# Patient Record
Sex: FEMALE | Race: ASIAN | Employment: UNEMPLOYED | URBAN - METROPOLITAN AREA
[De-identification: names, ages, dates, MRNs, and addresses within clinical notes are randomized per-mention and may not be internally consistent; named-entity substitution may affect disease eponyms.]

---

## 2023-03-08 ENCOUNTER — HOSPITAL ENCOUNTER (EMERGENCY)
Facility: HOSPITAL | Age: 28
Discharge: HOME | End: 2023-03-08
Attending: STUDENT IN AN ORGANIZED HEALTH CARE EDUCATION/TRAINING PROGRAM
Payer: COMMERCIAL

## 2023-03-08 ENCOUNTER — APPOINTMENT (OUTPATIENT)
Dept: ULTRASOUND IMAGING | Facility: HOSPITAL | Age: 28
End: 2023-03-08
Attending: STUDENT IN AN ORGANIZED HEALTH CARE EDUCATION/TRAINING PROGRAM
Payer: COMMERCIAL

## 2023-03-08 VITALS
SYSTOLIC BLOOD PRESSURE: 109 MMHG | HEART RATE: 84 BPM | OXYGEN SATURATION: 100 % | RESPIRATION RATE: 17 BRPM | DIASTOLIC BLOOD PRESSURE: 69 MMHG | TEMPERATURE: 98 F

## 2023-03-08 DIAGNOSIS — O20.0 THREATENED ABORTION: Primary | ICD-10-CM

## 2023-03-08 PROBLEM — O20.9 VAGINAL BLEEDING IN PREGNANCY, FIRST TRIMESTER (HCC): Status: ACTIVE | Noted: 2023-03-08

## 2023-03-08 PROBLEM — O20.9 VAGINAL BLEEDING IN PREGNANCY, FIRST TRIMESTER: Status: ACTIVE | Noted: 2023-03-08

## 2023-03-08 LAB
ALBUMIN SERPL-MCNC: 3.8 G/DL (ref 3.4–5)
ALBUMIN/GLOB SERPL: 1 {RATIO} (ref 1–2)
ALP LIVER SERPL-CCNC: 51 U/L
ALT SERPL-CCNC: 19 U/L
ANION GAP SERPL CALC-SCNC: 4 MMOL/L (ref 0–18)
AST SERPL-CCNC: 15 U/L (ref 15–37)
B-HCG SERPL-ACNC: ABNORMAL MIU/ML
B-HCG UR QL: POSITIVE
BASOPHILS # BLD AUTO: 0.03 X10(3) UL (ref 0–0.2)
BASOPHILS NFR BLD AUTO: 0.3 %
BILIRUB SERPL-MCNC: 0.4 MG/DL (ref 0.1–2)
BILIRUB UR QL STRIP.AUTO: NEGATIVE
BUN BLD-MCNC: 12 MG/DL (ref 7–18)
CALCIUM BLD-MCNC: 8.9 MG/DL (ref 8.5–10.1)
CHLORIDE SERPL-SCNC: 105 MMOL/L (ref 98–112)
CO2 SERPL-SCNC: 28 MMOL/L (ref 21–32)
CREAT BLD-MCNC: 0.69 MG/DL
EOSINOPHIL # BLD AUTO: 0.05 X10(3) UL (ref 0–0.7)
EOSINOPHIL NFR BLD AUTO: 0.4 %
ERYTHROCYTE [DISTWIDTH] IN BLOOD BY AUTOMATED COUNT: 13.2 %
GFR SERPLBLD BASED ON 1.73 SQ M-ARVRAT: 121 ML/MIN/1.73M2 (ref 60–?)
GLOBULIN PLAS-MCNC: 3.9 G/DL (ref 2.8–4.4)
GLUCOSE BLD-MCNC: 102 MG/DL (ref 70–99)
GLUCOSE UR STRIP.AUTO-MCNC: NEGATIVE MG/DL
HCT VFR BLD AUTO: 36 %
HGB BLD-MCNC: 12.2 G/DL
IMM GRANULOCYTES # BLD AUTO: 0.04 X10(3) UL (ref 0–1)
IMM GRANULOCYTES NFR BLD: 0.4 %
KETONES UR STRIP.AUTO-MCNC: 20 MG/DL
LEUKOCYTE ESTERASE UR QL STRIP.AUTO: NEGATIVE
LYMPHOCYTES # BLD AUTO: 1.45 X10(3) UL (ref 1–4)
LYMPHOCYTES NFR BLD AUTO: 12.8 %
MCH RBC QN AUTO: 30.9 PG (ref 26–34)
MCHC RBC AUTO-ENTMCNC: 33.9 G/DL (ref 31–37)
MCV RBC AUTO: 91.1 FL
MONOCYTES # BLD AUTO: 0.89 X10(3) UL (ref 0.1–1)
MONOCYTES NFR BLD AUTO: 7.8 %
NEUTROPHILS # BLD AUTO: 8.89 X10 (3) UL (ref 1.5–7.7)
NEUTROPHILS # BLD AUTO: 8.89 X10(3) UL (ref 1.5–7.7)
NEUTROPHILS NFR BLD AUTO: 78.3 %
NITRITE UR QL STRIP.AUTO: NEGATIVE
OSMOLALITY SERPL CALC.SUM OF ELEC: 284 MOSM/KG (ref 275–295)
PH UR STRIP.AUTO: 5 [PH] (ref 5–8)
PLATELET # BLD AUTO: 245 10(3)UL (ref 150–450)
POTASSIUM SERPL-SCNC: 3.1 MMOL/L (ref 3.5–5.1)
PROT SERPL-MCNC: 7.7 G/DL (ref 6.4–8.2)
PROT UR STRIP.AUTO-MCNC: 100 MG/DL
RBC # BLD AUTO: 3.95 X10(6)UL
RBC #/AREA URNS AUTO: >10 /HPF
RH BLOOD TYPE: POSITIVE
SODIUM SERPL-SCNC: 137 MMOL/L (ref 136–145)
SP GR UR STRIP.AUTO: >1.03 (ref 1–1.03)
UROBILINOGEN UR STRIP.AUTO-MCNC: 4 MG/DL
WBC # BLD AUTO: 11.4 X10(3) UL (ref 4–11)

## 2023-03-08 PROCEDURE — 86900 BLOOD TYPING SEROLOGIC ABO: CPT | Performed by: STUDENT IN AN ORGANIZED HEALTH CARE EDUCATION/TRAINING PROGRAM

## 2023-03-08 PROCEDURE — 76801 OB US < 14 WKS SINGLE FETUS: CPT | Performed by: STUDENT IN AN ORGANIZED HEALTH CARE EDUCATION/TRAINING PROGRAM

## 2023-03-08 PROCEDURE — 85025 COMPLETE CBC W/AUTO DIFF WBC: CPT

## 2023-03-08 PROCEDURE — 86901 BLOOD TYPING SEROLOGIC RH(D): CPT | Performed by: STUDENT IN AN ORGANIZED HEALTH CARE EDUCATION/TRAINING PROGRAM

## 2023-03-08 PROCEDURE — 84702 CHORIONIC GONADOTROPIN TEST: CPT | Performed by: STUDENT IN AN ORGANIZED HEALTH CARE EDUCATION/TRAINING PROGRAM

## 2023-03-08 PROCEDURE — 80053 COMPREHEN METABOLIC PANEL: CPT

## 2023-03-08 PROCEDURE — 81001 URINALYSIS AUTO W/SCOPE: CPT

## 2023-03-08 PROCEDURE — 85025 COMPLETE CBC W/AUTO DIFF WBC: CPT | Performed by: STUDENT IN AN ORGANIZED HEALTH CARE EDUCATION/TRAINING PROGRAM

## 2023-03-08 PROCEDURE — 81001 URINALYSIS AUTO W/SCOPE: CPT | Performed by: STUDENT IN AN ORGANIZED HEALTH CARE EDUCATION/TRAINING PROGRAM

## 2023-03-08 PROCEDURE — 81025 URINE PREGNANCY TEST: CPT

## 2023-03-08 PROCEDURE — 99285 EMERGENCY DEPT VISIT HI MDM: CPT

## 2023-03-08 PROCEDURE — 80053 COMPREHEN METABOLIC PANEL: CPT | Performed by: STUDENT IN AN ORGANIZED HEALTH CARE EDUCATION/TRAINING PROGRAM

## 2023-03-08 PROCEDURE — 96374 THER/PROPH/DIAG INJ IV PUSH: CPT

## 2023-03-08 PROCEDURE — 76817 TRANSVAGINAL US OBSTETRIC: CPT | Performed by: STUDENT IN AN ORGANIZED HEALTH CARE EDUCATION/TRAINING PROGRAM

## 2023-03-08 PROCEDURE — 99284 EMERGENCY DEPT VISIT MOD MDM: CPT

## 2023-03-08 RX ORDER — MORPHINE SULFATE 2 MG/ML
2 INJECTION, SOLUTION INTRAMUSCULAR; INTRAVENOUS ONCE
Status: COMPLETED | OUTPATIENT
Start: 2023-03-08 | End: 2023-03-08

## 2023-03-09 ENCOUNTER — TELEPHONE (OUTPATIENT)
Dept: OBGYN CLINIC | Facility: CLINIC | Age: 28
End: 2023-03-09

## 2023-03-09 NOTE — TELEPHONE ENCOUNTER
Patient was see in the ER and was told to be seen in the office tomorrow or Saturday.  Please advise

## 2023-03-09 NOTE — DISCHARGE INSTRUCTIONS
Return to the emergency room immediately if you have vaginal bleeding that soaks through a pad every hour for 3 to 4 hours. If you have difficulty breathing, chest pain, or if you pass out come back immediately otherwise follow-up with OB/GYN on Friday or Saturday.

## 2023-03-09 NOTE — ED INITIAL ASSESSMENT (HPI)
Patient to the ER c/o vaginal cramping. Patient reports her period was one week late, she is on it now and states it feels different. Patient states she is having a lot of pain and 4 days of period.  No dizziness no n/v/d

## 2023-03-14 ENCOUNTER — LAB ENCOUNTER (OUTPATIENT)
Dept: LAB | Age: 28
End: 2023-03-14
Attending: NURSE PRACTITIONER
Payer: COMMERCIAL

## 2023-03-14 ENCOUNTER — OFFICE VISIT (OUTPATIENT)
Dept: OBGYN CLINIC | Facility: CLINIC | Age: 28
End: 2023-03-14
Payer: COMMERCIAL

## 2023-03-14 VITALS
RESPIRATION RATE: 20 BRPM | WEIGHT: 118 LBS | DIASTOLIC BLOOD PRESSURE: 70 MMHG | BODY MASS INDEX: 22.86 KG/M2 | HEART RATE: 95 BPM | HEIGHT: 60.24 IN | SYSTOLIC BLOOD PRESSURE: 118 MMHG

## 2023-03-14 DIAGNOSIS — Z32.01 PREGNANCY EXAMINATION OR TEST, POSITIVE RESULT: Primary | ICD-10-CM

## 2023-03-14 DIAGNOSIS — O20.9 BLEEDING IN EARLY PREGNANCY: ICD-10-CM

## 2023-03-14 DIAGNOSIS — O03.9 SAB (SPONTANEOUS ABORTION): ICD-10-CM

## 2023-03-14 DIAGNOSIS — O20.9 BLEEDING IN EARLY PREGNANCY: Primary | ICD-10-CM

## 2023-03-14 LAB — B-HCG SERPL-ACNC: 1235 MIU/ML

## 2023-03-14 PROCEDURE — 3008F BODY MASS INDEX DOCD: CPT | Performed by: NURSE PRACTITIONER

## 2023-03-14 PROCEDURE — 99202 OFFICE O/P NEW SF 15 MIN: CPT | Performed by: NURSE PRACTITIONER

## 2023-03-14 PROCEDURE — 84702 CHORIONIC GONADOTROPIN TEST: CPT | Performed by: NURSE PRACTITIONER

## 2023-03-14 PROCEDURE — 3078F DIAST BP <80 MM HG: CPT | Performed by: NURSE PRACTITIONER

## 2023-03-14 PROCEDURE — 3074F SYST BP LT 130 MM HG: CPT | Performed by: NURSE PRACTITIONER

## 2023-03-14 NOTE — PROGRESS NOTES
Subjective:  28year old No obstetric history on file. Patient presents with: Other: ED follow up. Started [place] 3/5 with bleeding and cramping  Went to ED on Wednesday 3/8  Still bleeding, intermittently heavy - denies hemorrhage bleeding  Also notes some slight cramping, but denies severe abdominal pain    Review of Systems:  Pertinent items are noted in the HPI. Objective:  /70   Pulse 95   Resp 20   Ht 60.24\"   Wt 118 lb (53.5 kg)   LMP 01/20/2023     Physical Examination:  General appearance: Well dressed, well nourished in no apparent distress  Neurologic/Psychiatric: Alert and oriented to person, place and time, mood normal, affect appropriate    Assessment/Plan:    Diagnoses and all orders for this visit:    Bleeding in early pregnancy  - reviewed ED notes, imaging and labs      - HCG, BETA SUBUNIT (QUANT PREGNANCY TEST); Future  - RH + - rhogam not indicated  - ED precautions for bleeding and pain given  - follow up determined by lab results    Return if symptoms worsen or fail to improve.

## 2023-03-14 NOTE — PROGRESS NOTES
Patient notified of results and recommendations. She will have repeat HCG in 1 week. Understanding expressed.

## 2023-03-21 ENCOUNTER — LAB ENCOUNTER (OUTPATIENT)
Dept: LAB | Age: 28
End: 2023-03-21
Attending: NURSE PRACTITIONER
Payer: COMMERCIAL

## 2023-03-21 DIAGNOSIS — O03.9 SAB (SPONTANEOUS ABORTION): ICD-10-CM

## 2023-03-21 DIAGNOSIS — O03.9 SAB (SPONTANEOUS ABORTION): Primary | ICD-10-CM

## 2023-03-21 LAB — B-HCG SERPL-ACNC: 282 MIU/ML

## 2023-03-21 PROCEDURE — 84702 CHORIONIC GONADOTROPIN TEST: CPT | Performed by: NURSE PRACTITIONER

## 2023-03-21 NOTE — PROGRESS NOTES
Contacted patient results and recommendations given. Patient verbalized understanding and willingness to comply. No further questions. New order placed.

## 2023-03-28 ENCOUNTER — LAB ENCOUNTER (OUTPATIENT)
Dept: LAB | Age: 28
End: 2023-03-28
Attending: NURSE PRACTITIONER
Payer: COMMERCIAL

## 2023-03-28 DIAGNOSIS — O03.9 SAB (SPONTANEOUS ABORTION): ICD-10-CM

## 2023-03-28 LAB — B-HCG SERPL-ACNC: 102 MIU/ML

## 2023-03-28 PROCEDURE — 84702 CHORIONIC GONADOTROPIN TEST: CPT

## 2023-03-29 DIAGNOSIS — O03.9 SAB (SPONTANEOUS ABORTION): Primary | ICD-10-CM

## 2023-04-05 ENCOUNTER — OFFICE VISIT (OUTPATIENT)
Facility: CLINIC | Age: 28
End: 2023-04-05
Payer: COMMERCIAL

## 2023-04-05 ENCOUNTER — LAB ENCOUNTER (OUTPATIENT)
Dept: LAB | Facility: HOSPITAL | Age: 28
End: 2023-04-05
Attending: OBSTETRICS & GYNECOLOGY
Payer: COMMERCIAL

## 2023-04-05 ENCOUNTER — TELEPHONE (OUTPATIENT)
Facility: CLINIC | Age: 28
End: 2023-04-05

## 2023-04-05 VITALS
HEIGHT: 60.24 IN | HEART RATE: 98 BPM | DIASTOLIC BLOOD PRESSURE: 62 MMHG | SYSTOLIC BLOOD PRESSURE: 100 MMHG | WEIGHT: 121 LBS | BODY MASS INDEX: 23.44 KG/M2

## 2023-04-05 DIAGNOSIS — O03.9 MISCARRIAGE: Primary | ICD-10-CM

## 2023-04-05 DIAGNOSIS — Z12.4 ROUTINE CERVICAL SMEAR: ICD-10-CM

## 2023-04-05 DIAGNOSIS — O03.9 MISCARRIAGE: ICD-10-CM

## 2023-04-05 LAB — B-HCG SERPL-ACNC: 32 MIU/ML

## 2023-04-05 PROCEDURE — 3008F BODY MASS INDEX DOCD: CPT | Performed by: OBSTETRICS & GYNECOLOGY

## 2023-04-05 PROCEDURE — 99203 OFFICE O/P NEW LOW 30 MIN: CPT | Performed by: OBSTETRICS & GYNECOLOGY

## 2023-04-05 PROCEDURE — 36415 COLL VENOUS BLD VENIPUNCTURE: CPT

## 2023-04-05 PROCEDURE — 3078F DIAST BP <80 MM HG: CPT | Performed by: OBSTETRICS & GYNECOLOGY

## 2023-04-05 PROCEDURE — 84702 CHORIONIC GONADOTROPIN TEST: CPT

## 2023-04-05 PROCEDURE — 3074F SYST BP LT 130 MM HG: CPT | Performed by: OBSTETRICS & GYNECOLOGY

## 2023-04-05 NOTE — PROGRESS NOTES
Kendall Corona is a 28year old female  Patient's last menstrual period was 2023 (exact date). Patient presents with:  New Patient: Did not like her previous doctor she saw, they never called her about her results, she did see the MyChart results but there was no note that she could see   Miscarriage: 1mo ago started miscarrying, still bleeding  . She had an ultrasound with different provider that showed a 6-week demise. She has been have beta hCGs that have been decreasing appropriately. She is still bleeding some. Minimal cramping. Periods have been irregular. Denies smoking or marijuana alcohol. pre  [place] vitamins were discussed. We will repeat hCG 1. She can attempt pregnancy again. She is Rh+. OBSTETRICS HISTORY:  OB History    Para Term  AB Living   1       1     SAB IAB Ectopic Multiple Live Births   1              # Outcome Date GA Lbr Gilson/2nd Weight Sex Delivery Anes PTL Lv   1 SAB                GYNE HISTORY:  Periods irregular light      Sexual activity: Not on file                 MEDICAL HISTORY:  History reviewed. No pertinent past medical history. SURGICAL HISTORY:  History reviewed. No pertinent surgical history.     SOCIAL HISTORY:  Social History    Socioeconomic History      Marital status:       Spouse name: Not on file      Number of children: Not on file      Years of education: Not on file      Highest education level: Not on file    Occupational History      Not on file    Tobacco Use      Smoking status: Never      Smokeless tobacco: Never    Vaping Use      Vaping status: Not on file    Substance and Sexual Activity      Alcohol use: Not Currently      Drug use: Never      Sexual activity: Not on file    Other Topics      Concerns:        Not on file    Social History Narrative      Not on file    Social Determinants of Health  Financial Resource Strain: Not on file  Food Insecurity: Not on file  Transportation Needs: Not on file  Physical Activity: Not on file  Stress: Not on file  Social Connections: Not on file  Housing Stability: Not on file    FAMILY HISTORY:  History reviewed. No pertinent family history. MEDICATIONS:  No current outpatient medications on file. ALLERGIES:  No Known Allergies      Review of Systems:  Constitutional:  Denies fatigue, night sweats, hot flashes  Gastrointestinal:  denies heartburn, abdominal pain, diarrhea or constipation  Genitourinary:  denies dysuria, incontinence, abnormal vaginal discharge, vaginal itching  Skin/Breast:  Denies any breast pain, lumps, or discharge. Neurological:  denies headaches, extremity weakness or numbness. PHYSICAL EXAM:   Constitutional: well developed, well nourished  Head/Face: normocephalic  Neck/Thyroid: thyroid symmetric, no thyromegaly, no nodules, no adenopathy  Breast: normal without palpable masses, tenderness, asymmetry, nipple discharge, nipple retraction or skin changes  Abdomen:  soft, nontender, nondistended, no masses  Skin/Hair: no unusual rashes or bruises   Extremities: no edema, no cyanosis  Psychiatric:  Oriented to time, place, person and situation. Appropriate mood and affect    Pelvic Exam:  External Genitalia: normal appearance, hair distribution, and no lesions  Urethral Meatus:  normal in size, location, without lesions and prolapse  Bladder:  No fullness, masses or tenderness  Vagina:  Normal appearance without lesions, no abnormal discharge  Cervix:  Normal without tenderness on motion minimal blood Pap and GC. Uterus: Slightly soft. Perineum: normal  Anus: no hemorroids     Assessment & Plan:  Yessica Celeste was seen today for new patient and miscarriage. Diagnoses and all orders for this visit:    Miscarriage  -     HCG, BETA SUBUNIT (QUANT PREGNANCY TEST); Future        Incomplete miscarriage desires pregnancy in the future.

## 2023-04-05 NOTE — TELEPHONE ENCOUNTER
Hcg result 32 discussed. Pt to repeat hcg in 2-3wks per . Patient verbalized understanding, agreed to and intend to comply with plan of care.

## 2023-12-06 ENCOUNTER — OFFICE VISIT (OUTPATIENT)
Facility: CLINIC | Age: 28
End: 2023-12-06
Payer: COMMERCIAL

## 2023-12-06 VITALS
BODY MASS INDEX: 24.97 KG/M2 | HEART RATE: 90 BPM | WEIGHT: 127.19 LBS | HEIGHT: 60 IN | SYSTOLIC BLOOD PRESSURE: 104 MMHG | DIASTOLIC BLOOD PRESSURE: 68 MMHG

## 2023-12-06 DIAGNOSIS — N92.6 IRREGULAR MENSES: Primary | ICD-10-CM

## 2023-12-06 PROCEDURE — 3078F DIAST BP <80 MM HG: CPT | Performed by: OBSTETRICS & GYNECOLOGY

## 2023-12-06 PROCEDURE — 99213 OFFICE O/P EST LOW 20 MIN: CPT | Performed by: OBSTETRICS & GYNECOLOGY

## 2023-12-06 PROCEDURE — 3074F SYST BP LT 130 MM HG: CPT | Performed by: OBSTETRICS & GYNECOLOGY

## 2023-12-06 PROCEDURE — 3008F BODY MASS INDEX DOCD: CPT | Performed by: OBSTETRICS & GYNECOLOGY

## 2023-12-06 NOTE — PROGRESS NOTES
Joselin Long is a 33year old female  Patient's last menstrual period was 2023 (exact date). Chief Complaint   Patient presents with    Gyn Problem     Periods come every month but pt bleeds more than usual.. period comes for a week then stops for days then starts bleeding again   Pt trying to conceive    . Had a miscarriage in March. She has been abstaining from intercourse. She has not done ovulation predictor kits or apps. Her periods are usually every month. She usually bleeds for 5 days. Her last period was November beginning  and she has not started her period yet. Normal pregnancies between 21 and 35 days were discussed. She is only taking prenatal vitamins. No bleeding after intercourse or pain. No history of PID we will check day 21 TSH and progesterone. Clomid risk and benefits were discussed. 1 time since she had the miscarriage she bled for 2 weeks. Denies smoking marijuana    OBSTETRICS HISTORY:  OB History    Para Term  AB Living   1       1     SAB IAB Ectopic Multiple Live Births   1              # Outcome Date GA Lbr Gilson/2nd Weight Sex Delivery Anes PTL Lv   1 SAB                GYNE HISTORY:  Periods regular monthly    History   Sexual Activity    Sexual activity: Yes    Partners: Male        Pap Date: 23  Pap Result Notes: neg        MEDICAL HISTORY:  Past Medical History:   Diagnosis Date    Abnormal uterine bleeding        SURGICAL HISTORY:  History reviewed. No pertinent surgical history.     SOCIAL HISTORY:  Social History     Socioeconomic History    Marital status:      Spouse name: Not on file    Number of children: Not on file    Years of education: Not on file    Highest education level: Not on file   Occupational History    Not on file   Tobacco Use    Smoking status: Never    Smokeless tobacco: Never   Vaping Use    Vaping Use: Never used   Substance and Sexual Activity    Alcohol use: Not Currently    Drug use: Never Sexual activity: Yes     Partners: Male   Other Topics Concern    Caffeine Concern No    Exercise No    Seat Belt No    Special Diet No    Stress Concern No    Weight Concern No   Social History Narrative    Not on file     Social Determinants of Health     Financial Resource Strain: Not on file   Food Insecurity: Not on file   Transportation Needs: Not on file   Physical Activity: Not on file   Stress: Not on file   Social Connections: Not on file   Housing Stability: Not on file       FAMILY HISTORY:  History reviewed. No pertinent family history. MEDICATIONS:  No current outpatient medications on file. ALLERGIES:  No Known Allergies      Review of Systems:  Constitutional:  Denies fatigue, night sweats, hot flashes  Gastrointestinal:  denies heartburn, abdominal pain, diarrhea or constipation  Genitourinary:  denies dysuria, incontinence, abnormal vaginal discharge, vaginal itching  Neurological:  denies headaches, extremity weakness or numbness. PHYSICAL EXAM:   Constitutional: well developed, well nourished  Head/Face: normocephalic    Skin/Hair: no unusual rashes or bruises   Extremities: no edema, no cyanosis  Psychiatric:  Oriented to time, place, person and situation. Appropriate mood and affect    Pelvic Exam:  External Genitalia: normal appearance, hair distribution, and no lesions  Urethral Meatus:  normal in size, location, without lesions and prolapse  Bladder:  No fullness, masses or tenderness  Vagina:  Normal appearance without lesions, no abnormal discharge  Cervix:  Normal without tenderness on motion  Uterus: normal in size, contour, position, mobility, without tenderness  Adnexa: normal without masses or tenderness  Perineum: normal  Anus: no hemorroids     Assessment & Plan:  Ishmael Ornelas was seen today for gyn problem. Diagnoses and all orders for this visit:    Irregular menses        Irregular menses attempting pregnancy since about September. Check day 21 progesterone and TSH. Clomid discussed. She is taking prenatal vitamins. Refuses flu shot.

## 2024-02-22 ENCOUNTER — OFFICE VISIT (OUTPATIENT)
Facility: CLINIC | Age: 29
End: 2024-02-22
Payer: COMMERCIAL

## 2024-02-22 VITALS
WEIGHT: 127.38 LBS | DIASTOLIC BLOOD PRESSURE: 72 MMHG | BODY MASS INDEX: 24.05 KG/M2 | SYSTOLIC BLOOD PRESSURE: 100 MMHG | HEIGHT: 61 IN | HEART RATE: 89 BPM

## 2024-02-22 DIAGNOSIS — N92.6 IRREGULAR MENSES: Primary | ICD-10-CM

## 2024-02-22 PROCEDURE — 99213 OFFICE O/P EST LOW 20 MIN: CPT | Performed by: OBSTETRICS & GYNECOLOGY

## 2024-02-22 PROCEDURE — 3074F SYST BP LT 130 MM HG: CPT | Performed by: OBSTETRICS & GYNECOLOGY

## 2024-02-22 PROCEDURE — 3008F BODY MASS INDEX DOCD: CPT | Performed by: OBSTETRICS & GYNECOLOGY

## 2024-02-22 PROCEDURE — 3078F DIAST BP <80 MM HG: CPT | Performed by: OBSTETRICS & GYNECOLOGY

## 2024-02-22 NOTE — PROGRESS NOTES
Ciara Che is a 28 year old female  Patient's last menstrual period was 02/15/2024 (exact date).   Chief Complaint   Patient presents with    Gyn Problem     Patient reports irregular periods, has bleed for 17 days this month so far on and off and would like to try conceive    .     She is having very irregular periods usually they were 6 to 7 days.  She has been bleeding lightly now for 17 days.  No pain.  She was unable to get her day 21 progesterone done because she started her period on that day.  She stopped taking the prenatal vitamin it was encouraged to start that again.  She bled the last 5 days and it was 5 days after her last period.  Clomid risk and benefits were discussed she will start it today.  She will get a day 21 progesterone CBC and TSH.    OBSTETRICS HISTORY:  OB History    Para Term  AB Living   1       1     SAB IAB Ectopic Multiple Live Births   1              # Outcome Date GA Lbr Gilson/2nd Weight Sex Delivery Anes PTL Lv   1 SAB                GYNE HISTORY:  Periods irregular light    History   Sexual Activity    Sexual activity: Yes    Partners: Male        Hx Prior Abnormal Pap: No  Pap Date: 23  Pap Result Notes: Negative        MEDICAL HISTORY:  Past Medical History:   Diagnosis Date    Abnormal uterine bleeding        SURGICAL HISTORY:  History reviewed. No pertinent surgical history.    SOCIAL HISTORY:  Social History     Socioeconomic History    Marital status:      Spouse name: Not on file    Number of children: Not on file    Years of education: Not on file    Highest education level: Not on file   Occupational History    Not on file   Tobacco Use    Smoking status: Never    Smokeless tobacco: Never   Vaping Use    Vaping Use: Never used   Substance and Sexual Activity    Alcohol use: Not Currently    Drug use: Never    Sexual activity: Yes     Partners: Male   Other Topics Concern    Caffeine Concern No    Exercise No    Seat Belt No    Special  Diet No    Stress Concern No    Weight Concern No   Social History Narrative    Not on file     Social Determinants of Health     Financial Resource Strain: Not on file   Food Insecurity: Not on file   Transportation Needs: Not on file   Physical Activity: Not on file   Stress: Not on file   Social Connections: Not on file   Housing Stability: Not on file       FAMILY HISTORY:  History reviewed. No pertinent family history.    MEDICATIONS:  No current outpatient medications on file.    ALLERGIES:  No Known Allergies      Review of Systems:  Constitutional:  Denies fatigue, night sweats, hot flashes    Neurological:  denies headaches, extremity weakness or numbness.      PHYSICAL EXAM:   Constitutional: well developed, well nourished  Head/Face: normocephalic    Skin/Hair: no unusual rashes or bruises   Extremities: no edema, no cyanosis  Psychiatric:  Oriented to time, place, person and situation. Appropriate mood and affect    Pelvic Exam:  External Genitalia: normal appearance, hair distribution, and no lesions  Urethral Meatus:  normal in size, location, without lesions and prolapse  Bladder:  No fullness, masses or tenderness  Vagina:  Normal appearance without lesions, no abnormal discharge  Cervix:  Normal without tenderness on motion  Uterus: normal in size, contour, position, mobility, without tenderness  Adnexa: normal without masses or tenderness  Perineum: normal  Anus: no hemorroids     Assessment & Plan:  There are no diagnoses linked to this encounter.    Irregular menses.  Probable anovulation.  Desires pregnancy.  Clomid was discussed.  Day 21 progesterone TSH and CBC

## 2024-02-23 ENCOUNTER — TELEPHONE (OUTPATIENT)
Facility: CLINIC | Age: 29
End: 2024-02-23

## 2024-02-23 RX ORDER — LETROZOLE 2.5 MG/1
2.5 TABLET, FILM COATED ORAL DAILY
Qty: 30 TABLET | Refills: 0 | Status: SHIPPED | OUTPATIENT
Start: 2024-02-23

## 2024-02-23 NOTE — TELEPHONE ENCOUNTER
Needs PA for clomid, awaiting for PA form from [facility].     --Serophene does not need PA will route for recommendation.

## 2024-03-07 ENCOUNTER — LAB ENCOUNTER (OUTPATIENT)
Dept: LAB | Age: 29
End: 2024-03-07
Attending: OBSTETRICS & GYNECOLOGY
Payer: COMMERCIAL

## 2024-03-07 DIAGNOSIS — N92.6 IRREGULAR MENSES: ICD-10-CM

## 2024-03-07 LAB
BASOPHILS # BLD AUTO: 0.04 X10(3) UL (ref 0–0.2)
BASOPHILS NFR BLD AUTO: 0.4 %
EOSINOPHIL # BLD AUTO: 0.1 X10(3) UL (ref 0–0.7)
EOSINOPHIL NFR BLD AUTO: 1.1 %
ERYTHROCYTE [DISTWIDTH] IN BLOOD BY AUTOMATED COUNT: 12.1 %
HCT VFR BLD AUTO: 45.3 %
HGB BLD-MCNC: 15.2 G/DL
IMM GRANULOCYTES # BLD AUTO: 0.04 X10(3) UL (ref 0–1)
IMM GRANULOCYTES NFR BLD: 0.4 %
LYMPHOCYTES # BLD AUTO: 3.36 X10(3) UL (ref 1–4)
LYMPHOCYTES NFR BLD AUTO: 35.8 %
MCH RBC QN AUTO: 30.5 PG (ref 26–34)
MCHC RBC AUTO-ENTMCNC: 33.6 G/DL (ref 31–37)
MCV RBC AUTO: 91 FL
MONOCYTES # BLD AUTO: 0.36 X10(3) UL (ref 0.1–1)
MONOCYTES NFR BLD AUTO: 3.8 %
NEUTROPHILS # BLD AUTO: 5.48 X10 (3) UL (ref 1.5–7.7)
NEUTROPHILS # BLD AUTO: 5.48 X10(3) UL (ref 1.5–7.7)
NEUTROPHILS NFR BLD AUTO: 58.5 %
PLATELET # BLD AUTO: 274 10(3)UL (ref 150–450)
PROGEST SERPL-MCNC: 7.18 NG/ML
RBC # BLD AUTO: 4.98 X10(6)UL
TSI SER-ACNC: 1.42 MIU/ML (ref 0.36–3.74)
WBC # BLD AUTO: 9.4 X10(3) UL (ref 4–11)

## 2024-03-07 PROCEDURE — 84144 ASSAY OF PROGESTERONE: CPT

## 2024-03-07 PROCEDURE — 84443 ASSAY THYROID STIM HORMONE: CPT

## 2024-03-07 PROCEDURE — 85025 COMPLETE CBC W/AUTO DIFF WBC: CPT

## 2024-03-14 ENCOUNTER — TELEPHONE (OUTPATIENT)
Facility: CLINIC | Age: 29
End: 2024-03-14

## 2024-03-14 DIAGNOSIS — N92.6 IRREGULAR MENSES: Primary | ICD-10-CM

## 2024-03-18 NOTE — TELEPHONE ENCOUNTER
Rx Clomid has been sent to her pharmacy. Patient verbalized understanding, agreed to and intend to comply with plan of care.

## 2024-03-18 NOTE — TELEPHONE ENCOUNTER
Pt calling to speak to a nurse about medication she was expecting at the pharmacy.  Please advise.

## 2024-03-21 ENCOUNTER — TELEPHONE (OUTPATIENT)
Facility: CLINIC | Age: 29
End: 2024-03-21

## 2024-03-21 NOTE — TELEPHONE ENCOUNTER
Spoke with patient. She is aware her pharmacy was updated after her prescription had been sent. She picked up the correct medication(clomid) and knows how to take it. Understanding verbalized.

## 2024-03-21 NOTE — TELEPHONE ENCOUNTER
Pt called to clarify what medication would need to be taken, Originally prescribed clomiPHENE Citrate 50 MG Oral Tab, pt picked a different medication from pharm.

## 2024-05-16 ENCOUNTER — ULTRASOUND ENCOUNTER (OUTPATIENT)
Facility: CLINIC | Age: 29
End: 2024-05-16

## 2024-05-16 PROCEDURE — 76801 OB US < 14 WKS SINGLE FETUS: CPT | Performed by: OBSTETRICS & GYNECOLOGY

## 2024-05-18 DIAGNOSIS — O36.80X0 PREGNANCY WITH INCONCLUSIVE FETAL VIABILITY, SINGLE OR UNSPECIFIED FETUS (HCC): Primary | ICD-10-CM

## 2024-05-20 ENCOUNTER — INITIAL PRENATAL (OUTPATIENT)
Facility: CLINIC | Age: 29
End: 2024-05-20

## 2024-05-20 ENCOUNTER — LAB ENCOUNTER (OUTPATIENT)
Dept: LAB | Age: 29
End: 2024-05-20
Attending: OBSTETRICS & GYNECOLOGY

## 2024-05-20 VITALS
DIASTOLIC BLOOD PRESSURE: 72 MMHG | BODY MASS INDEX: 24.62 KG/M2 | HEIGHT: 61 IN | HEART RATE: 88 BPM | WEIGHT: 130.38 LBS | SYSTOLIC BLOOD PRESSURE: 118 MMHG

## 2024-05-20 DIAGNOSIS — Z12.4 CERVICAL CANCER SCREENING: ICD-10-CM

## 2024-05-20 DIAGNOSIS — Z34.81 PRENATAL CARE, SUBSEQUENT PREGNANCY IN FIRST TRIMESTER (HCC): Primary | ICD-10-CM

## 2024-05-20 DIAGNOSIS — Z3A.08 8 WEEKS GESTATION OF PREGNANCY (HCC): ICD-10-CM

## 2024-05-20 DIAGNOSIS — Z34.81 PRENATAL CARE, SUBSEQUENT PREGNANCY IN FIRST TRIMESTER (HCC): ICD-10-CM

## 2024-05-20 LAB
ANTIBODY SCREEN: NEGATIVE
BASOPHILS # BLD AUTO: 0.03 X10(3) UL (ref 0–0.2)
BASOPHILS NFR BLD AUTO: 0.3 %
EOSINOPHIL # BLD AUTO: 0.1 X10(3) UL (ref 0–0.7)
EOSINOPHIL NFR BLD AUTO: 0.9 %
ERYTHROCYTE [DISTWIDTH] IN BLOOD BY AUTOMATED COUNT: 13.5 %
HBV SURFACE AG SER-ACNC: <0.1 [IU]/L
HBV SURFACE AG SERPL QL IA: NONREACTIVE
HCT VFR BLD AUTO: 41.7 %
HCV AB SERPL QL IA: NONREACTIVE
HGB BLD-MCNC: 14 G/DL
IMM GRANULOCYTES # BLD AUTO: 0.05 X10(3) UL (ref 0–1)
IMM GRANULOCYTES NFR BLD: 0.4 %
LYMPHOCYTES # BLD AUTO: 2.59 X10(3) UL (ref 1–4)
LYMPHOCYTES NFR BLD AUTO: 22.5 %
MCH RBC QN AUTO: 30.6 PG (ref 26–34)
MCHC RBC AUTO-ENTMCNC: 33.6 G/DL (ref 31–37)
MCV RBC AUTO: 91 FL
MONOCYTES # BLD AUTO: 0.67 X10(3) UL (ref 0.1–1)
MONOCYTES NFR BLD AUTO: 5.8 %
NEUTROPHILS # BLD AUTO: 8.07 X10 (3) UL (ref 1.5–7.7)
NEUTROPHILS # BLD AUTO: 8.07 X10(3) UL (ref 1.5–7.7)
NEUTROPHILS NFR BLD AUTO: 70.1 %
PLATELET # BLD AUTO: 246 10(3)UL (ref 150–450)
RBC # BLD AUTO: 4.58 X10(6)UL
RH BLOOD TYPE: POSITIVE
RUBV IGG SER QL: POSITIVE
RUBV IGG SER-ACNC: 73.9 IU/ML (ref 10–?)
T PALLIDUM AB SER QL IA: NONREACTIVE
TSI SER-ACNC: 0.86 MIU/ML (ref 0.36–3.74)
WBC # BLD AUTO: 11.5 X10(3) UL (ref 4–11)

## 2024-05-20 PROCEDURE — 84443 ASSAY THYROID STIM HORMONE: CPT | Performed by: OBSTETRICS & GYNECOLOGY

## 2024-05-20 PROCEDURE — 87389 HIV-1 AG W/HIV-1&-2 AB AG IA: CPT | Performed by: OBSTETRICS & GYNECOLOGY

## 2024-05-20 PROCEDURE — 87591 N.GONORRHOEAE DNA AMP PROB: CPT | Performed by: OBSTETRICS & GYNECOLOGY

## 2024-05-20 PROCEDURE — 86901 BLOOD TYPING SEROLOGIC RH(D): CPT | Performed by: OBSTETRICS & GYNECOLOGY

## 2024-05-20 PROCEDURE — 86803 HEPATITIS C AB TEST: CPT | Performed by: OBSTETRICS & GYNECOLOGY

## 2024-05-20 PROCEDURE — 86780 TREPONEMA PALLIDUM: CPT | Performed by: OBSTETRICS & GYNECOLOGY

## 2024-05-20 PROCEDURE — 87086 URINE CULTURE/COLONY COUNT: CPT | Performed by: OBSTETRICS & GYNECOLOGY

## 2024-05-20 PROCEDURE — 85025 COMPLETE CBC W/AUTO DIFF WBC: CPT | Performed by: OBSTETRICS & GYNECOLOGY

## 2024-05-20 PROCEDURE — 86762 RUBELLA ANTIBODY: CPT | Performed by: OBSTETRICS & GYNECOLOGY

## 2024-05-20 PROCEDURE — 87340 HEPATITIS B SURFACE AG IA: CPT | Performed by: OBSTETRICS & GYNECOLOGY

## 2024-05-20 PROCEDURE — 86850 RBC ANTIBODY SCREEN: CPT | Performed by: OBSTETRICS & GYNECOLOGY

## 2024-05-20 PROCEDURE — 86900 BLOOD TYPING SEROLOGIC ABO: CPT | Performed by: OBSTETRICS & GYNECOLOGY

## 2024-05-20 PROCEDURE — 87491 CHLMYD TRACH DNA AMP PROBE: CPT | Performed by: OBSTETRICS & GYNECOLOGY

## 2024-05-20 RX ORDER — CHOLECALCIFEROL (VITAMIN D3) 25 MCG
1 TABLET,CHEWABLE ORAL DAILY
COMMUNITY

## 2024-05-20 NOTE — PROGRESS NOTES
1st OB    Patient has no complaints, denies h/o HSV, blood transfusion, hepatitis    EDC by LMP c/w 8 wks US    Pap smear today  - NIPS and carrier screen next visit  - 20 wk US

## 2024-05-21 LAB
C TRACH DNA SPEC QL NAA+PROBE: NEGATIVE
N GONORRHOEA DNA SPEC QL NAA+PROBE: NEGATIVE

## 2024-05-24 LAB
.: NORMAL
.: NORMAL

## 2024-06-19 PROBLEM — O09.291 PREGNANCY WITH HISTORY OF MISCARRIAGE, FIRST TRIMESTER (HCC): Status: ACTIVE | Noted: 2024-06-19

## 2024-06-19 PROBLEM — N92.6 IRREGULAR MENSES: Status: ACTIVE | Noted: 2023-03-01

## 2024-06-19 PROBLEM — O20.9 VAGINAL BLEEDING IN PREGNANCY, FIRST TRIMESTER (HCC): Status: RESOLVED | Noted: 2023-03-08 | Resolved: 2024-06-19

## 2024-06-19 PROBLEM — O09.01 CLOMID PREGNANCY IN FIRST TRIMESTER (HCC): Status: ACTIVE | Noted: 2024-06-19

## 2024-06-19 NOTE — PROGRESS NOTES
JACQUELINE    Chief Complaint   Patient presents with    Prenatal Care     JACQUELINE 13w 0d  -No complaints   No  needed    Partner here   No nausea, vaginal bleeding, sometimes some groin pain left side.     28 year old  at 13w0d  SHANTI 24 by LMP c/w 8w0d US  A+    -NIPT - would like. Ordered Will do today.   -Declines NT ultrasound   -Carrier screen - would like. Ordered. Will do today  -AFP discussed - would like to do it.   -Fetal anatomy US at 20 wk discussed & ordered     Clomid pregnancy/Irregular menses  -24 TSH 0.859 - 1st trimester   -A1c ordered     RTC 4 wk

## 2024-06-19 NOTE — PATIENT INSTRUCTIONS
As a pregnant patient, if you are having a medical problem please CALL the office [phone] (do not \"[employer] message\") as we want to address your concerns immediately due to the pregnancy.     We share call with another [facility] ([facility]) of physicians - Elliott Young, Ekta Quinn, Adenike De La Fuente, Julien Jones.     We cannot guarantee that you will not see a male provider.     For nausea and vomiting:  -take vitamin B6 (25mg) +/- unisom (aka doxylamine) 12.5mg (this is half of a 25mg tablet) every night to PREVENT morning sickness. Buy these over the counter. Can also take these in the morning and in the afternoon, too, if needed.  -call if this is not effective, we can try a prescription nausea medication    Prenatal vitamin   -make sure it CONTAINS IRON. The gummy vitamins frequently DO NOT CONTAIN IRON.     Genetic screening  2 types to consider - both OPTIONAL:   1) Screening of the fetus for chromosomal disorders:   -Multiple ways to screen for this.   -None of these tests are 100% accurate. If an abnormal result is obtained, further testing is advised.   -Most popular are:       () cell free DNA (also called \"NIPS\" or \"non-invasive prenatal screening\")   -blood draw only   -looks for fragments of placental DNA in maternal bloodstream   -placental DNA does NOT always match fetal DNA  -timing: must be at least 10w0d to be drawn   -where: done in our office (or with high risk OB/MFM)   -cost: genetics company checks insurance benefits & calls you with cost estimate prior to running the test(s)          () nuchal translucency (NT) ultrasound   -measures thickness of fetal neck skin fold (looking for abnormal swelling)   -timinw0d - 13w6d   -where: high risk OB/MFM office. Cannot be done at our office.     2) Screening of the mother   -done to see if she is a carrier of a mutation that causes a disease, that she could pass along to the fetus. If she is positive for a mutation,  generally it is recommended to screen the father of the fetus to see if he is a carrier for the same mutation to determine risk of the fetus having the disease caused by the mutation.   -most common diseases like this (\"recessive\" genetic diseases) are Cystic Fibrosis, Spinal Muscular Atrophy, Sickle Cell Disease or Thalassemia.  -CAN BE DONE ANYTIME, ideally even prior to pregnancy   -NOTE: all babies are tested at birth for ~30 of the most common of these diseases, so do not necessarily need to test the mother during pregnancy. Testing now is an optional & personal choice     Low dose aspirin  -recommended if 1 or more risk factors for preeclampsia  -seems to help reduce risk of preeclampsia  -recommended time to start is 11-12 weeks  -current [facility] recommendation is 1.5 tablets of the aspirin 81 mg tab. If you cannot split the tablet you can take 2.     AFP level   There is an optional blood test that we can perform on you called \"AFP level.\" It is a chemical in the bloodstream produced by the pregnancy. It is done between 15-20 weeks gestation. The ideal time for testing is actually 16-18 weeks gestation. If the level is abnormally elevated, this can indicate the presence of abnormalities of the development of the brain and spinal cord such as anencephaly (lack of brain development) and spina bifida (exposed spinal cord). These anomalies can generally be seen on ultrasound. It can also be elevated in cases of normal fetal anatomy and can indicate an abnormal placenta. This may or may not be able to be detected on ultrasound. Please think about whether or not you would like to have this test done. When you decide when you would like to have this test done, please let us know. We must enter your exact gestational age and weight on the date of the blood draw for the test to be calculated accurately.      Fetal anatomy scan (\"20 week ultrasound\")  -can be done in our office (schedule with ) if no particular  high risk feature or indication  -recommend having done with MFM (Maternal Fetal Medicine aka \"High Risk OB\") if higher risk e.g family history of birth defects, chronic hypertension, diabetes, advanced maternal age, etc.     Maternal Fetal Medicine (MFM)  Emile Boucher [place], [place]   [address], [address]   Ph [phone]    [place]   [address]  [phone]    [place]  [address], [address]  Ph [phone]    [place]  [address] [place] [address], [address]  Ph [phone]     Prenatal classes   [email or web address]    Vaccines during pregnancy  Tdap - recommended each pregnancy in early 3rd trimester  Influenza - recommended each pregnancy during flu season  COVID-19 - new 2023 booster recommended during pregnancy  RSV (respiratory syncytial virus) - [id number] during RSV season, generally corresponding with flu season

## 2024-06-20 ENCOUNTER — ROUTINE PRENATAL (OUTPATIENT)
Facility: CLINIC | Age: 29
End: 2024-06-20

## 2024-06-20 ENCOUNTER — TELEPHONE (OUTPATIENT)
Facility: CLINIC | Age: 29
End: 2024-06-20

## 2024-06-20 VITALS
DIASTOLIC BLOOD PRESSURE: 62 MMHG | WEIGHT: 131 LBS | HEIGHT: 61 IN | BODY MASS INDEX: 24.73 KG/M2 | SYSTOLIC BLOOD PRESSURE: 112 MMHG | HEART RATE: 110 BPM

## 2024-06-20 DIAGNOSIS — O09.01: Primary | ICD-10-CM

## 2024-06-20 DIAGNOSIS — Z13.71 SCREENING FOR GENETIC DISEASE CARRIER STATUS: ICD-10-CM

## 2024-06-20 DIAGNOSIS — Z36.1 ANTENATAL SCREENING FOR RAISED ALPHAFETOPROTEIN LEVEL (HCC): ICD-10-CM

## 2024-06-20 DIAGNOSIS — Z13.1 SCREENING FOR DIABETES MELLITUS: ICD-10-CM

## 2024-06-20 DIAGNOSIS — N92.6 IRREGULAR MENSES: ICD-10-CM

## 2024-06-20 DIAGNOSIS — O09.291 PREGNANCY WITH HISTORY OF MISCARRIAGE, FIRST TRIMESTER (HCC): ICD-10-CM

## 2024-06-20 DIAGNOSIS — Z36.89 ENCOUNTER FOR FETAL ANATOMIC SURVEY (HCC): ICD-10-CM

## 2024-06-20 DIAGNOSIS — Z36.0 ENCOUNTER FOR ANTENATAL SCREENING FOR CHROMOSOMAL ANOMALIES (HCC): ICD-10-CM

## 2024-06-20 PROCEDURE — 3078F DIAST BP <80 MM HG: CPT | Performed by: OBSTETRICS & GYNECOLOGY

## 2024-06-20 PROCEDURE — 3074F SYST BP LT 130 MM HG: CPT | Performed by: OBSTETRICS & GYNECOLOGY

## 2024-06-20 PROCEDURE — 3008F BODY MASS INDEX DOCD: CPT | Performed by: OBSTETRICS & GYNECOLOGY

## 2024-06-20 NOTE — TELEPHONE ENCOUNTER
Collins pregnancy 13 0/7    [facility] NIPS and carrier lab draw per Dr. Irvin's Order    Specimen tubes name/ labeled verified with patient  Specimen placed in  to order  Discussed to call office in 2 weeks for results, results/gender information will be sent in her [employer] portal.  Patient verbalized understanding, agreed to and intend to comply with plan of care.

## 2024-07-04 ENCOUNTER — TELEPHONE (OUTPATIENT)
Facility: CLINIC | Age: 29
End: 2024-07-04

## 2024-07-18 ENCOUNTER — LAB ENCOUNTER (OUTPATIENT)
Dept: LAB | Age: 29
End: 2024-07-18
Attending: OBSTETRICS & GYNECOLOGY
Payer: COMMERCIAL

## 2024-07-18 ENCOUNTER — ROUTINE PRENATAL (OUTPATIENT)
Facility: CLINIC | Age: 29
End: 2024-07-18
Payer: COMMERCIAL

## 2024-07-18 VITALS
HEART RATE: 110 BPM | DIASTOLIC BLOOD PRESSURE: 52 MMHG | WEIGHT: 134 LBS | HEIGHT: 61 IN | BODY MASS INDEX: 25.3 KG/M2 | SYSTOLIC BLOOD PRESSURE: 102 MMHG

## 2024-07-18 DIAGNOSIS — N92.6 IRREGULAR MENSES: ICD-10-CM

## 2024-07-18 DIAGNOSIS — Z3A.17 17 WEEKS GESTATION OF PREGNANCY (HCC): ICD-10-CM

## 2024-07-18 DIAGNOSIS — Z34.82 PRENATAL CARE, SUBSEQUENT PREGNANCY IN SECOND TRIMESTER (HCC): Primary | ICD-10-CM

## 2024-07-18 DIAGNOSIS — Z13.1 SCREENING FOR DIABETES MELLITUS: ICD-10-CM

## 2024-07-18 DIAGNOSIS — Z36.1 ANTENATAL SCREENING FOR RAISED ALPHAFETOPROTEIN LEVEL (HCC): ICD-10-CM

## 2024-07-18 LAB
EST. AVERAGE GLUCOSE BLD GHB EST-MCNC: 108 MG/DL (ref 68–126)
HBA1C MFR BLD: 5.4 % (ref ?–5.7)

## 2024-07-18 PROCEDURE — 3074F SYST BP LT 130 MM HG: CPT | Performed by: OBSTETRICS & GYNECOLOGY

## 2024-07-18 PROCEDURE — 3078F DIAST BP <80 MM HG: CPT | Performed by: OBSTETRICS & GYNECOLOGY

## 2024-07-18 PROCEDURE — 3008F BODY MASS INDEX DOCD: CPT | Performed by: OBSTETRICS & GYNECOLOGY

## 2024-07-18 PROCEDURE — 83036 HEMOGLOBIN GLYCOSYLATED A1C: CPT | Performed by: OBSTETRICS & GYNECOLOGY

## 2024-07-18 PROCEDURE — 82105 ALPHA-FETOPROTEIN SERUM: CPT | Performed by: OBSTETRICS & GYNECOLOGY

## 2024-07-18 NOTE — PROGRESS NOTES
Patient has no complaints  - 20wk US ordered    SHANTI 12/26/24 by LMP c/w 8w0d US      -NIPT - negative  -Declines NT ultrasound   -Carrier screen - negative  -AFP ordered -reminded   -Fetal anatomy US at 20 wk discussed & ordered     Clomid pregnancy/Irregular menses  -5/20/24 TSH 0.859 - 1st trimester   -A1c ordered

## 2024-07-22 LAB
AFP MOM: 0.99
AFP VALUE: 62.8 NG/ML
GA ON COLL DATE: 20 WEEKS
INSULIN DEP AFP: NO
MAT AGE AT EDD: 30.3 YR
MULTIPLE GEST AFP: NO
OSBR RISK 1 IN AFP: NORMAL
WEIGHT AFP: 131 LBS

## 2024-08-14 ENCOUNTER — ROUTINE PRENATAL (OUTPATIENT)
Facility: CLINIC | Age: 29
End: 2024-08-14
Payer: COMMERCIAL

## 2024-08-14 ENCOUNTER — ULTRASOUND ENCOUNTER (OUTPATIENT)
Facility: CLINIC | Age: 29
End: 2024-08-14
Payer: COMMERCIAL

## 2024-08-14 VITALS
WEIGHT: 137 LBS | SYSTOLIC BLOOD PRESSURE: 100 MMHG | HEIGHT: 61 IN | BODY MASS INDEX: 25.86 KG/M2 | HEART RATE: 102 BPM | DIASTOLIC BLOOD PRESSURE: 64 MMHG

## 2024-08-14 DIAGNOSIS — Z3A.20 20 WEEKS GESTATION OF PREGNANCY (HCC): Primary | ICD-10-CM

## 2024-08-14 PROCEDURE — 3078F DIAST BP <80 MM HG: CPT

## 2024-08-14 PROCEDURE — 3074F SYST BP LT 130 MM HG: CPT

## 2024-08-14 PROCEDURE — 3008F BODY MASS INDEX DOCD: CPT

## 2024-08-14 NOTE — PROGRESS NOTES
JACQUELINE 20w6d - visit #3     She is doing well, no complaints  -anatomy scan done today, results pending     SHANTI 12/26/24 by LMP c/w 8w0d US        -NIPT - negative  -Declines NT ultrasound   -Carrier screen - negative  -AFP normal        Clomid pregnancy/Irregular menses  -5/20/24 TSH 0.859 - 1st trimester   -7/18 A1c 5.4%

## 2024-08-27 DIAGNOSIS — Z36.2: Primary | ICD-10-CM

## 2024-09-12 ENCOUNTER — ROUTINE PRENATAL (OUTPATIENT)
Facility: CLINIC | Age: 29
End: 2024-09-12
Payer: COMMERCIAL

## 2024-09-12 ENCOUNTER — ULTRASOUND ENCOUNTER (OUTPATIENT)
Facility: CLINIC | Age: 29
End: 2024-09-12
Payer: COMMERCIAL

## 2024-09-12 ENCOUNTER — TELEPHONE (OUTPATIENT)
Facility: CLINIC | Age: 29
End: 2024-09-12

## 2024-09-12 VITALS
DIASTOLIC BLOOD PRESSURE: 58 MMHG | WEIGHT: 146 LBS | HEIGHT: 61 IN | SYSTOLIC BLOOD PRESSURE: 108 MMHG | HEART RATE: 105 BPM | BODY MASS INDEX: 27.56 KG/M2

## 2024-09-12 DIAGNOSIS — Z36.2: ICD-10-CM

## 2024-09-12 DIAGNOSIS — Z3A.25 25 WEEKS GESTATION OF PREGNANCY (HCC): ICD-10-CM

## 2024-09-12 DIAGNOSIS — Z34.82 PRENATAL CARE, SUBSEQUENT PREGNANCY IN SECOND TRIMESTER (HCC): Primary | ICD-10-CM

## 2024-09-12 PROCEDURE — 3078F DIAST BP <80 MM HG: CPT | Performed by: OBSTETRICS & GYNECOLOGY

## 2024-09-12 PROCEDURE — 3074F SYST BP LT 130 MM HG: CPT | Performed by: OBSTETRICS & GYNECOLOGY

## 2024-09-12 PROCEDURE — 76816 OB US FOLLOW-UP PER FETUS: CPT | Performed by: OBSTETRICS & GYNECOLOGY

## 2024-09-12 PROCEDURE — 3008F BODY MASS INDEX DOCD: CPT | Performed by: OBSTETRICS & GYNECOLOGY

## 2024-09-12 NOTE — PROGRESS NOTES
Patient has no complaints    -20 wk NL  - 1GTT and CBC ordered    SHANTI 12/26/24 by LMP c/w 8w0d US        -NIPT - negative  -Declines NT ultrasound   -Carrier screen - negative  -AFP normal        Clomid pregnancy/Irregular menses  -5/20/24 TSH 0.859 - 1st trimester   -7/18 A1c 5.4%

## 2024-09-12 NOTE — TELEPHONE ENCOUNTER
Breast Pump order was received from [facility].  Order form was placed in Dr. Karen Maddox's bin for signature.

## 2024-09-19 ENCOUNTER — LAB ENCOUNTER (OUTPATIENT)
Dept: LAB | Age: 29
End: 2024-09-19
Attending: OBSTETRICS & GYNECOLOGY
Payer: COMMERCIAL

## 2024-09-19 DIAGNOSIS — Z34.82 PRENATAL CARE, SUBSEQUENT PREGNANCY IN SECOND TRIMESTER (HCC): ICD-10-CM

## 2024-09-19 LAB
BASOPHILS # BLD AUTO: 0.03 X10(3) UL (ref 0–0.2)
BASOPHILS NFR BLD AUTO: 0.4 %
EOSINOPHIL # BLD AUTO: 0.06 X10(3) UL (ref 0–0.7)
EOSINOPHIL NFR BLD AUTO: 0.7 %
ERYTHROCYTE [DISTWIDTH] IN BLOOD BY AUTOMATED COUNT: 14.5 %
GLUCOSE 1H P GLC SERPL-MCNC: 209 MG/DL
HCT VFR BLD AUTO: 40.4 %
HGB BLD-MCNC: 13.4 G/DL
IMM GRANULOCYTES # BLD AUTO: 0.16 X10(3) UL (ref 0–1)
IMM GRANULOCYTES NFR BLD: 1.9 %
LYMPHOCYTES # BLD AUTO: 1 X10(3) UL (ref 1–4)
LYMPHOCYTES NFR BLD AUTO: 11.9 %
MCH RBC QN AUTO: 31 PG (ref 26–34)
MCHC RBC AUTO-ENTMCNC: 33.2 G/DL (ref 31–37)
MCV RBC AUTO: 93.5 FL
MONOCYTES # BLD AUTO: 0.63 X10(3) UL (ref 0.1–1)
MONOCYTES NFR BLD AUTO: 7.5 %
NEUTROPHILS # BLD AUTO: 6.53 X10 (3) UL (ref 1.5–7.7)
NEUTROPHILS # BLD AUTO: 6.53 X10(3) UL (ref 1.5–7.7)
NEUTROPHILS NFR BLD AUTO: 77.6 %
PLATELET # BLD AUTO: 198 10(3)UL (ref 150–450)
RBC # BLD AUTO: 4.32 X10(6)UL
WBC # BLD AUTO: 8.4 X10(3) UL (ref 4–11)

## 2024-09-19 PROCEDURE — 85025 COMPLETE CBC W/AUTO DIFF WBC: CPT | Performed by: OBSTETRICS & GYNECOLOGY

## 2024-09-19 PROCEDURE — 82950 GLUCOSE TEST: CPT | Performed by: OBSTETRICS & GYNECOLOGY

## 2024-09-23 DIAGNOSIS — O24.410 DIET CONTROLLED GESTATIONAL DIABETES MELLITUS (GDM) IN THIRD TRIMESTER (HCC): Primary | ICD-10-CM

## 2024-10-02 ENCOUNTER — ROUTINE PRENATAL (OUTPATIENT)
Facility: CLINIC | Age: 29
End: 2024-10-02
Payer: COMMERCIAL

## 2024-10-02 VITALS
SYSTOLIC BLOOD PRESSURE: 114 MMHG | HEIGHT: 61 IN | BODY MASS INDEX: 27.75 KG/M2 | DIASTOLIC BLOOD PRESSURE: 66 MMHG | HEART RATE: 117 BPM | WEIGHT: 147 LBS

## 2024-10-02 DIAGNOSIS — Z23 NEED FOR VACCINATION: ICD-10-CM

## 2024-10-02 DIAGNOSIS — Z3A.27 27 WEEKS GESTATION OF PREGNANCY (HCC): Primary | ICD-10-CM

## 2024-10-02 DIAGNOSIS — Z23 FLU VACCINE NEED: ICD-10-CM

## 2024-10-02 DIAGNOSIS — Z36.9 ANTENATAL SCREENING ENCOUNTER (HCC): ICD-10-CM

## 2024-10-02 DIAGNOSIS — Z23 NEED FOR TDAP VACCINATION: ICD-10-CM

## 2024-10-02 PROCEDURE — 90715 TDAP VACCINE 7 YRS/> IM: CPT

## 2024-10-02 PROCEDURE — 3074F SYST BP LT 130 MM HG: CPT

## 2024-10-02 PROCEDURE — 90472 IMMUNIZATION ADMIN EACH ADD: CPT

## 2024-10-02 PROCEDURE — 90471 IMMUNIZATION ADMIN: CPT

## 2024-10-02 PROCEDURE — 90656 IIV3 VACC NO PRSV 0.5 ML IM: CPT

## 2024-10-02 PROCEDURE — 3008F BODY MASS INDEX DOCD: CPT

## 2024-10-02 PROCEDURE — 3078F DIAST BP <80 MM HG: CPT

## 2024-10-02 NOTE — PROGRESS NOTES
Peter 27w6d     She is doing well, no complaints   -Tdap and flu discussed and given today  -3rd tri HIV & T pal discussed & ordered.       SHANTI 12/26/24 by LMP c/w 8w0d US     -NIPT - negative  -Declines NT ultrasound   -Carrier screen - negative  -AFP normal   -2nd tri CBC - normal        Clomid pregnancy/Irregular menses  -5/20/24 TSH 0.859 - 1st trimester   -7/18 A1c 5.4%    GDM   -1 hr  - DM education advised, encouraged her to make an appointment. She had an appointment, but she cancelled it, wanted to discuss it at office visit before scheduling.

## 2024-10-03 ENCOUNTER — DIABETIC EDUCATION (OUTPATIENT)
Age: 29
End: 2024-10-03
Payer: COMMERCIAL

## 2024-10-03 DIAGNOSIS — O24.410 DIET CONTROLLED GESTATIONAL DIABETES MELLITUS (GDM) IN THIRD TRIMESTER (HCC): Primary | ICD-10-CM

## 2024-10-03 PROCEDURE — G0108 DIAB MANAGE TRN  PER INDIV: HCPCS

## 2024-10-03 RX ORDER — LANCETS 33 GAUGE
1 EACH MISCELLANEOUS 4 TIMES DAILY
Qty: 100 EACH | Refills: 3 | Status: SHIPPED | OUTPATIENT
Start: 2024-10-03

## 2024-10-03 RX ORDER — BLOOD-GLUCOSE METER
1 EACH MISCELLANEOUS ONCE
Qty: 1 KIT | Refills: 0 | Status: SHIPPED | OUTPATIENT
Start: 2024-10-03 | End: 2024-10-03

## 2024-10-03 RX ORDER — BLOOD SUGAR DIAGNOSTIC
STRIP MISCELLANEOUS
Qty: 100 STRIP | Refills: 3 | Status: SHIPPED | OUTPATIENT
Start: 2024-10-03

## 2024-10-03 NOTE — PATIENT INSTRUCTIONS
Blood Glucose Goals    Start checking your blood sugars 4 times/day:  1.) Fasting (before your first meal of the day)  2.) 2 hours after breakfast  3.) 2 hours after lunch  4.) 2 hours after dinner    Bring your recorded glucose log sheet and food log sheet to your follow up visit in 2 weeks for review.    Ranges:   Fasting: less than 95 mg/dL  2 hours after meal: less than 120 mg/dL    Food Goals    Healthy, well rounded diet with plenty of water.    At least 175 grams of carbohydrates a day.    Avoid going longer than 5 hours between meals/snacks.    Meal Plan:  Spreading out our carbohydrates throughout the day and adding in small amounts of movement after meals can help us deal with the glucose better.     Breakfast: 30 grams of carbohydrates.  AM Snack: 15 grams of carbohydrates.  Lunch: 45 grams of carbohydrates.  PM Snack: 15 grams of carbohydrates.  Dinner: 45 grams of carbohydrates.  Bedtime Snack: 30 grams of carbohydrates.

## 2024-10-03 NOTE — PROGRESS NOTES
Ciara Che   1995 was seen for Gestational Diabetes Counseling: Individual    Date: 10/3/2024  Referring Provider: Karen Maddox DO Start time: 1:55 PM End time: 2:40 PM      Assessment: LMP 2024 (Exact Date)   Weight:   Wt Readings from Last 6 Encounters:   10/02/24 147 lb   24 146 lb   24 137 lb   24 134 lb   24 131 lb   24 130 lb 6.4 oz         Ms. Che presents today for initial gestational diabetes education. She reports she was not called about GDM diagnosis.       Estimated Date of Delivery: 24   Gestation: 28w0d    History:     OB History    Para Term  AB Living   2 0 0 0 1 0   SAB IAB Ectopic Multiple Live Births   1 0 0 0 0        GDM Screen:     GLUCOSE 1HR OB   Date Value Ref Range Status   2024 209 (H) See comment mg/dL Final     Comment:     If the plasma glucose level measured after 1 hour is >=130, 135 or 140 mg/dl proceed to \"Glucose Tolerance, 100 gm (0 hr, 1 hr, 2hr, 3hr), Gestational (ADA)\" test on a separate day, as clinically indicated.            History of GDM:  No  Family history of Type 2 Diabetes: No    Diet & Exercise:     Obtained usual diet history: 2.5 meals per day. Not currently working.      TYPICAL  FOOD  NOTES   Breakfast  Milk, 1-2 eggs, bread, corn, dumpling, water.          Lunch  Smaller (snack sized)         Dinner  Meat, vegetable, rice or noodles         Snacks  Cake, cookies         Beverages  Milk, water, coffee with milk and caramel and vanilla, orange juice         Eating out  Not really, mostly cooking at home             Current physical activity: not really, fatigued. Back/hip pain at times.       Education:     GDM Overview:  Reviewed gestational diabetes as diagnosis including target blood glucose values.  Benefits, risks, and management options for improving/maintaining glucose control to mother/baby discussed.    Healthy Eating:  Discussed nutrition concepts for pregnancy/healthy eating and  effects of food on BG value.  Timing of meals; what is a carbohydrate, protein, fat.  Taught: Carb counting, label reading, meal planning.  Suggested minimal carb intake of 175 gm.    Being Active:  Benefits and effects of activity on BG discussed.  Reviewed types of recommended activity, duration, precautions, and when to call MD.    Monitoring:  Instructed on how to use glucose monitor/proper lancet disposal. Checking schedules are:   Fastin-95 mg/dL, Call MD is >105 md/dL twice in 1 week   2 Hour Post Prandial:  Less than 120 md/dL, Call MD if >140 md/dL twice in 1 week.    Pt came in with a a glucose meter:  No  Instructed /demonstrated ability to perform blood glucose checking on: OneTouch Verio   mg/dL, 1 hour since eating.      Taking Medication:  Reviewed when medication might be indicated.    Reducing Risk:  Effects of elevated blood glucose on mother/baby reviewed.  Discussed management (hyperglycemia, hypoglycemia, sick day, other) and when to call provider.  Post pregnancy management/prevention of Type 2 DM, and increased risk of having diabetes later in life reviewed.    Healthy Coping:  Family involvement/social support encouraged.  Identification of lifestyle behaviors willing to change discussed.    Training Tools Provided:   handout.  BG Log Sheets    Recommendations:      1. Follow recommended meal plan.   2. Begin checking fasting glucose and 2 hour after meals   3. Bring glucose / food log to next visit with diabetes educator. Bring glucose log sheets to MD office visits.   4. Encouraged activity if no restrictions.   5. Encouraged Ciara to contact the diabetes center with any questions or concerns.    Glucose meter kit, test strips and lancets sent to preferred pharmacy.    Patient verbalized understanding and has no further questions at this time.  Emailed/written materials provided for all topics covered.    Scheduled pt to follow-up x 1 with the Diabetes Center  10/17/2024     Clementine RUGGIERON-RN, [facility]

## 2024-10-07 ENCOUNTER — LAB ENCOUNTER (OUTPATIENT)
Dept: LAB | Age: 29
End: 2024-10-07
Payer: COMMERCIAL

## 2024-10-07 DIAGNOSIS — Z36.9 ANTENATAL SCREENING ENCOUNTER (HCC): ICD-10-CM

## 2024-10-07 LAB — T PALLIDUM AB SER QL IA: NONREACTIVE

## 2024-10-07 PROCEDURE — 86780 TREPONEMA PALLIDUM: CPT

## 2024-10-07 PROCEDURE — 87389 HIV-1 AG W/HIV-1&-2 AB AG IA: CPT

## 2024-10-17 ENCOUNTER — DIABETIC EDUCATION (OUTPATIENT)
Age: 29
End: 2024-10-17
Payer: COMMERCIAL

## 2024-10-17 ENCOUNTER — ROUTINE PRENATAL (OUTPATIENT)
Facility: CLINIC | Age: 29
End: 2024-10-17
Payer: COMMERCIAL

## 2024-10-17 VITALS
BODY MASS INDEX: 27.53 KG/M2 | WEIGHT: 145.81 LBS | HEART RATE: 98 BPM | DIASTOLIC BLOOD PRESSURE: 60 MMHG | SYSTOLIC BLOOD PRESSURE: 98 MMHG | HEIGHT: 61 IN

## 2024-10-17 DIAGNOSIS — Z3A.30 30 WEEKS GESTATION OF PREGNANCY (HCC): ICD-10-CM

## 2024-10-17 DIAGNOSIS — Z34.83 PRENATAL CARE, SUBSEQUENT PREGNANCY IN THIRD TRIMESTER (HCC): Primary | ICD-10-CM

## 2024-10-17 DIAGNOSIS — O24.410 DIET CONTROLLED GESTATIONAL DIABETES MELLITUS (GDM) IN THIRD TRIMESTER (HCC): Primary | ICD-10-CM

## 2024-10-17 PROCEDURE — 3008F BODY MASS INDEX DOCD: CPT | Performed by: OBSTETRICS & GYNECOLOGY

## 2024-10-17 PROCEDURE — 3074F SYST BP LT 130 MM HG: CPT | Performed by: OBSTETRICS & GYNECOLOGY

## 2024-10-17 PROCEDURE — 3078F DIAST BP <80 MM HG: CPT | Performed by: OBSTETRICS & GYNECOLOGY

## 2024-10-17 NOTE — PROGRESS NOTES
Ciara Che  : 1995 was seen for GDM  Individual Follow-Up Counseling    Date: 10/17/2024  Referring Provider: Karen Maddox  Start time: 9:30 AM End time: 9:40 AM      Assessment: LMP 2024 (Exact Date)   Weight:   Wt Readings from Last 6 Encounters:   10/02/24 147 lb   24 146 lb   24 137 lb   24 134 lb   24 131 lb   24 130 lb 6.4 oz       Ms. Che presents today for follow up gestational diabetes education. She declined use of  for today's visit. She reports she saw her OB this morning who told her to just check fasting and 2 hours after each meal from here on out. She is to follow up with her in 2 weeks.    Estimated Date of Delivery: 24   Gestation:30w0d    Blood Glucose Levels:      Lowest Highest Average Amount out of target   Fasting    74 mg/dL  89 mg/dL  81 mg/dL  0 readings/13 total   2hr post Breakfast    88 mg/dL  118 mg/dL  107 mg/dL  0 readings/13 total   2hr post Lunch    76 mg/dL  135 mg/dL  99 mg/dL  2 readings/14 total   2hr post Dinner    83 mg/dL  125 mg/dL  98 mg/dL  2 readings/14 total     She brought in BG log. Reviewed in detail with patient. Copies made and sent to scan.     Diet & Exercise:     Following meal plan: Yes  Skips: NA  Meals are balanced Yes per patient.  Carb Intake is Adequate per patient.  Food Selections are Healthy.  Drinking plenty of water Yes per patient.    Education:     GDM Overview:  Reviewed target blood glucose values for GDM.  Discussed benefits/risks to mother/baby management options to improve/maintain glucose control.     Healthy Eating:  Reviewed/Reinforced:  Nutrition concepts for pregnancy/healthy eating and effect of food on blood glucose.  Meal planning process and benefits of pre-planning meals/snacks.  Appropriate timing of meals/snacks.  Carb counting.  4 servings of calcium while pregnant/breastfeeding.  Additional concepts: Increasing fiber    Being Active:  Reviewed benefits of effects of  activity on BG values.  Reviewed types of activity, duration, precautions.    Monitoring:  Instructed to report readings to MD as directed.  Call MD: if fasting blood glucose is > 95 twice in 1 week. If 2 hr postprandial is > 120 twice in 1 week at any one meal.    Taking Medication:  Reviewed appropriate timing (if on insulin) of meds.   Reviewed probability of needing medication adjustments throughout pregnancy.     Reducing Risk:  Discussed management of (hyperglycemia, hypoglycemia) and when to call provider.    Recommendations:      1. Follow recommended meal plan.   2. Test blood glucose and ketones as directed.    3. Bring glucose log to each MD visit.   4. Start/continue activity if no restrictions.    5. Additional recommendations: follow up as needed.    Patient verbalized understanding and has no further questions at this time.      Clementine ALMEIDA-RN, [facility]

## 2024-10-17 NOTE — PATIENT INSTRUCTIONS
Blood Glucose Goals     Continue checking your blood sugars.  Ranges:   Fasting: less than 95 mg/dL  2 hours after meal: less than 120 mg/dL     Let your OB know if:  - your fasting is more than 95 mg/dL 2x in a week  - your 2 hours after a meal is more than 120 mg/dL 2x in a week     Food Goals     Healthy, well rounded diet with plenty of water.     At least 175 grams of carbohydrates a day.     Avoid going longer than 5 hours between meals/snacks.      Aim for 4x servings of calcium per day. This will help with your baby's development. Foods with calcium include:  - Dairy products (milk, yogurt, cheese)  - Oranges and calcium added orange juice  - Kale, jeremy greens, broccoli  - Dried figs, papayas, and bananas  - Seeds: Leif, sesame  - Beans and lentils  - Almonds  - Rhubarb  - Fortified foods and drinks (cereals, non-dairy milks)  - Tofu  - Edamame

## 2024-10-17 NOTE — PROGRESS NOTES
Patient has no complaints    -Tdap and flu  given , RSV next visit   -3rd tri HIV & T pal done      SHANTI 12/26/24 by LMP c/w 8w0d US     -NIPT - negative  -Declines NT ultrasound   -Carrier screen - negative  -AFP normal   -2nd tri CBC - normal        Clomid pregnancy/Irregular menses  -5/20/24 TSH 0.859 - 1st trimester   -7/18 A1c 5.4%    GDM   -1 hr  - DM education done  - glucose log reviewed - all WNL, patient may check her b.s. twice a day instead

## 2024-10-31 ENCOUNTER — ROUTINE PRENATAL (OUTPATIENT)
Facility: CLINIC | Age: 29
End: 2024-10-31
Payer: COMMERCIAL

## 2024-10-31 VITALS
DIASTOLIC BLOOD PRESSURE: 66 MMHG | HEIGHT: 61 IN | BODY MASS INDEX: 27.94 KG/M2 | WEIGHT: 148 LBS | HEART RATE: 112 BPM | SYSTOLIC BLOOD PRESSURE: 106 MMHG

## 2024-10-31 DIAGNOSIS — Z34.90 PRENATAL CARE, ANTEPARTUM (HCC): Primary | ICD-10-CM

## 2024-10-31 DIAGNOSIS — Z29.11 NEED FOR RSV VACCINATION: ICD-10-CM

## 2024-10-31 DIAGNOSIS — O24.410 DIET CONTROLLED GESTATIONAL DIABETES MELLITUS (GDM) IN THIRD TRIMESTER (HCC): ICD-10-CM

## 2024-10-31 PROCEDURE — 90471 IMMUNIZATION ADMIN: CPT | Performed by: STUDENT IN AN ORGANIZED HEALTH CARE EDUCATION/TRAINING PROGRAM

## 2024-10-31 PROCEDURE — 90678 RSV VACC PREF BIVALENT IM: CPT | Performed by: STUDENT IN AN ORGANIZED HEALTH CARE EDUCATION/TRAINING PROGRAM

## 2024-10-31 PROCEDURE — 3008F BODY MASS INDEX DOCD: CPT | Performed by: STUDENT IN AN ORGANIZED HEALTH CARE EDUCATION/TRAINING PROGRAM

## 2024-10-31 PROCEDURE — 3078F DIAST BP <80 MM HG: CPT | Performed by: STUDENT IN AN ORGANIZED HEALTH CARE EDUCATION/TRAINING PROGRAM

## 2024-10-31 PROCEDURE — 3074F SYST BP LT 130 MM HG: CPT | Performed by: STUDENT IN AN ORGANIZED HEALTH CARE EDUCATION/TRAINING PROGRAM

## 2024-10-31 NOTE — PROGRESS NOTES
JACQUELINE - 32w0d     Patient has no complaints, baby active  Sugars all normal    SHANTI 12/26/24 by LMP c/w 8w0d US     -NIPT - negative  -Declines NT ultrasound   -Carrier screen - negative  -AFP normal   -2nd tri CBC - normal  -Tdap and flu  given   -3rd tri HIV & T pal done  -RSV vaccine done     Clomid pregnancy/Irregular menses  -5/20/24 TSH 0.859 - 1st trimester   -7/18 A1c 5.4%    GDMA1  -1 hr  - DM education done  - so far sugars well controlled with diet  - ordered growth US

## 2024-11-12 ENCOUNTER — ULTRASOUND ENCOUNTER (OUTPATIENT)
Facility: CLINIC | Age: 29
End: 2024-11-12
Payer: COMMERCIAL

## 2024-11-12 ENCOUNTER — ROUTINE PRENATAL (OUTPATIENT)
Facility: CLINIC | Age: 29
End: 2024-11-12
Payer: COMMERCIAL

## 2024-11-12 VITALS
HEART RATE: 102 BPM | DIASTOLIC BLOOD PRESSURE: 62 MMHG | SYSTOLIC BLOOD PRESSURE: 102 MMHG | BODY MASS INDEX: 28 KG/M2 | WEIGHT: 148 LBS

## 2024-11-12 DIAGNOSIS — Z3A.33 33 WEEKS GESTATION OF PREGNANCY (HCC): ICD-10-CM

## 2024-11-12 DIAGNOSIS — O24.410 DIET CONTROLLED GESTATIONAL DIABETES MELLITUS (GDM) IN THIRD TRIMESTER (HCC): ICD-10-CM

## 2024-11-12 DIAGNOSIS — O24.410 DIET CONTROLLED GESTATIONAL DIABETES MELLITUS (GDM) IN THIRD TRIMESTER (HCC): Primary | ICD-10-CM

## 2024-11-12 PROCEDURE — 76816 OB US FOLLOW-UP PER FETUS: CPT | Performed by: STUDENT IN AN ORGANIZED HEALTH CARE EDUCATION/TRAINING PROGRAM

## 2024-11-12 PROCEDURE — 3078F DIAST BP <80 MM HG: CPT

## 2024-11-12 PROCEDURE — 3074F SYST BP LT 130 MM HG: CPT

## 2024-11-12 NOTE — PROGRESS NOTES
Peter 33w5d    Doing well, no complaints     Fasting bs 83-85, postprandial all less than 120.   Peds discussed, information given  Growth US done today - results pending     SHANTI 12/26/24 by LMP c/w 8w0d US     -NIPT - negative  -Declines NT ultrasound   -Carrier screen - negative  -AFP normal   -2nd tri CBC - normal  -Tdap and flu  given   -3rd tri HIV & T pal done  -RSV vaccine done     Clomid pregnancy/Irregular menses  -5/20/24 TSH 0.859 - 1st trimester   -7/18 A1c 5.4%     GDMA1  -1 hr  - DM education done  - so far sugars well controlled with diet  - ordered growth US

## 2024-11-27 ENCOUNTER — ROUTINE PRENATAL (OUTPATIENT)
Facility: CLINIC | Age: 29
End: 2024-11-27
Payer: COMMERCIAL

## 2024-11-27 VITALS
HEIGHT: 61 IN | WEIGHT: 150.63 LBS | BODY MASS INDEX: 28.44 KG/M2 | HEART RATE: 80 BPM | DIASTOLIC BLOOD PRESSURE: 70 MMHG | SYSTOLIC BLOOD PRESSURE: 116 MMHG

## 2024-11-27 DIAGNOSIS — Z3A.35 35 WEEKS GESTATION OF PREGNANCY (HCC): ICD-10-CM

## 2024-11-27 DIAGNOSIS — Z34.83 PRENATAL CARE, SUBSEQUENT PREGNANCY IN THIRD TRIMESTER (HCC): Primary | ICD-10-CM

## 2024-11-27 PROCEDURE — 3074F SYST BP LT 130 MM HG: CPT | Performed by: OBSTETRICS & GYNECOLOGY

## 2024-11-27 PROCEDURE — 3008F BODY MASS INDEX DOCD: CPT | Performed by: OBSTETRICS & GYNECOLOGY

## 2024-11-27 PROCEDURE — 3078F DIAST BP <80 MM HG: CPT | Performed by: OBSTETRICS & GYNECOLOGY

## 2024-11-27 NOTE — PROGRESS NOTES
Patient has no complaints  - GBS and SVE next visit    Blood sugars well controlled with diet      SHANTI 12/26/24 by LMP c/w 8w0d US     -NIPT - negative  -Declines NT ultrasound   -Carrier screen - negative  -AFP normal   -2nd tri CBC - normal  -Tdap and flu  given   -3rd tri HIV & T pal done  -RSV vaccine done     Clomid pregnancy/Irregular menses  -5/20/24 TSH 0.859 - 1st trimester   -7/18 A1c 5.4%     GDMA1  -1 hr  - DM education done  - so far sugars well controlled with diet  - growth US     GA =33W3D   EFW =2407GRMS=55.6%TILE   AC 93%TILE   PRESENTATION - CEPHALIC   AMNIOTIC FLUID - 19.57 CM

## 2024-12-05 ENCOUNTER — ROUTINE PRENATAL (OUTPATIENT)
Facility: CLINIC | Age: 29
End: 2024-12-05
Payer: COMMERCIAL

## 2024-12-05 VITALS
DIASTOLIC BLOOD PRESSURE: 70 MMHG | WEIGHT: 153.63 LBS | HEART RATE: 76 BPM | BODY MASS INDEX: 29.01 KG/M2 | HEIGHT: 61 IN | SYSTOLIC BLOOD PRESSURE: 112 MMHG

## 2024-12-05 DIAGNOSIS — Z34.90 PRENATAL CARE, ANTEPARTUM (HCC): Primary | ICD-10-CM

## 2024-12-05 PROCEDURE — 87150 DNA/RNA AMPLIFIED PROBE: CPT | Performed by: STUDENT IN AN ORGANIZED HEALTH CARE EDUCATION/TRAINING PROGRAM

## 2024-12-05 PROCEDURE — 87081 CULTURE SCREEN ONLY: CPT | Performed by: STUDENT IN AN ORGANIZED HEALTH CARE EDUCATION/TRAINING PROGRAM

## 2024-12-05 PROCEDURE — 3078F DIAST BP <80 MM HG: CPT | Performed by: STUDENT IN AN ORGANIZED HEALTH CARE EDUCATION/TRAINING PROGRAM

## 2024-12-05 PROCEDURE — 3074F SYST BP LT 130 MM HG: CPT | Performed by: STUDENT IN AN ORGANIZED HEALTH CARE EDUCATION/TRAINING PROGRAM

## 2024-12-05 PROCEDURE — 3008F BODY MASS INDEX DOCD: CPT | Performed by: STUDENT IN AN ORGANIZED HEALTH CARE EDUCATION/TRAINING PROGRAM

## 2024-12-05 NOTE — PROGRESS NOTES
Patient has no complaints  Blood sugars well controlled with diet  SVE - closed. Was vertex by US at 34wk    SHANTI 12/26/24 by LMP c/w 8w0d US     -NIPT - negative  -Declines NT ultrasound   -Carrier screen - negative  -AFP normal   -2nd tri CBC - normal  -Tdap and flu  given   -3rd tri HIV & T pal done  -RSV vaccine done  -GBS done     Clomid pregnancy/Irregular menses  -5/20/24 TSH 0.859 - 1st trimester   -7/18 A1c 5.4%     GDMA1  -1 hr  - DM education done  - so far sugars well controlled with diet  - growth US done 11/12/24, normal EFW 56%ile, AC 93%ile, normal fluid, vertex

## 2024-12-07 LAB — GROUP B STREP BY PCR FOR PCR OVT: POSITIVE

## 2024-12-12 ENCOUNTER — ROUTINE PRENATAL (OUTPATIENT)
Facility: CLINIC | Age: 29
End: 2024-12-12
Payer: COMMERCIAL

## 2024-12-12 ENCOUNTER — TELEPHONE (OUTPATIENT)
Facility: CLINIC | Age: 29
End: 2024-12-12

## 2024-12-12 VITALS
DIASTOLIC BLOOD PRESSURE: 62 MMHG | HEART RATE: 93 BPM | HEIGHT: 61 IN | WEIGHT: 151.81 LBS | SYSTOLIC BLOOD PRESSURE: 102 MMHG | BODY MASS INDEX: 28.66 KG/M2

## 2024-12-12 DIAGNOSIS — O24.410 DIET CONTROLLED GESTATIONAL DIABETES MELLITUS (GDM) IN THIRD TRIMESTER (HCC): ICD-10-CM

## 2024-12-12 DIAGNOSIS — Z34.90 PRENATAL CARE, ANTEPARTUM (HCC): Primary | ICD-10-CM

## 2024-12-12 DIAGNOSIS — Z3A.38 38 WEEKS GESTATION OF PREGNANCY (HCC): ICD-10-CM

## 2024-12-12 PROCEDURE — 3078F DIAST BP <80 MM HG: CPT

## 2024-12-12 PROCEDURE — 3074F SYST BP LT 130 MM HG: CPT

## 2024-12-12 PROCEDURE — 3008F BODY MASS INDEX DOCD: CPT

## 2024-12-12 NOTE — PROGRESS NOTES
Peter 38w0d   Fasting 87-86, postprandial less than 120, highest 117  -sve 1-2/40/-2 cephalic   -IOL discussed with her and partner, would like around 39 weeks - message sent to schedulers.    SHANTI 12/26/24 by LMP c/w 8w0d US     -NIPT - negative  -Declines NT ultrasound   -Carrier screen - negative  -AFP normal   -2nd tri CBC - normal  -Tdap and flu  given   -3rd tri HIV & T pal done  -RSV vaccine done  -GBS post -abx in labor     Clomid pregnancy/Irregular menses  -5/20/24 TSH 0.859 - 1st trimester   -7/18 A1c 5.4%     GDMA1  -1 hr  - DM education done  - so far sugars well controlled with diet  - growth US done 11/12/24, normal EFW 56%ile, AC 93%ile, normal fluid, vertex

## 2024-12-12 NOTE — TELEPHONE ENCOUNTER
----- Message from Lauren Garrett sent at 12/12/2024  2:39 PM CST -----  Regarding: IOL at 39 wks for GDM  Please schedule her for pitocin IOL at or around 39 weeks. Thanks

## 2024-12-19 ENCOUNTER — HOSPITAL ENCOUNTER (INPATIENT)
Facility: HOSPITAL | Age: 29
LOS: 4 days | Discharge: HOME | End: 2024-12-23
Attending: OBSTETRICS & GYNECOLOGY | Admitting: OBSTETRICS & GYNECOLOGY
Payer: COMMERCIAL

## 2024-12-19 ENCOUNTER — ANESTHESIA (OUTPATIENT)
Dept: OBGYN CLINIC | Facility: HOSPITAL | Age: 29
End: 2024-12-19
Payer: COMMERCIAL

## 2024-12-19 ENCOUNTER — APPOINTMENT (OUTPATIENT)
Dept: OBGYN CLINIC | Facility: HOSPITAL | Age: 29
End: 2024-12-19
Payer: COMMERCIAL

## 2024-12-19 ENCOUNTER — ANESTHESIA (OUTPATIENT)
Dept: OBGYN UNIT | Facility: HOSPITAL | Age: 29
End: 2024-12-19
Payer: COMMERCIAL

## 2024-12-19 ENCOUNTER — ANESTHESIA EVENT (OUTPATIENT)
Dept: OBGYN UNIT | Facility: HOSPITAL | Age: 29
End: 2024-12-19
Payer: COMMERCIAL

## 2024-12-19 ENCOUNTER — TELEPHONE (OUTPATIENT)
Facility: CLINIC | Age: 29
End: 2024-12-19

## 2024-12-19 ENCOUNTER — ANESTHESIA EVENT (OUTPATIENT)
Dept: OBGYN CLINIC | Facility: HOSPITAL | Age: 29
End: 2024-12-19
Payer: COMMERCIAL

## 2024-12-19 PROBLEM — Z34.90 ENCOUNTER FOR INDUCTION OF LABOR (HCC): Status: ACTIVE | Noted: 2024-12-19

## 2024-12-19 PROBLEM — O24.410 DIET CONTROLLED GESTATIONAL DIABETES MELLITUS (GDM) IN THIRD TRIMESTER (HCC): Status: ACTIVE | Noted: 2024-12-19

## 2024-12-19 PROBLEM — O09.03 CLOMID PREGNANCY IN THIRD TRIMESTER (HCC): Status: ACTIVE | Noted: 2024-06-19

## 2024-12-19 PROBLEM — Z3A.39 39 WEEKS GESTATION OF PREGNANCY (HCC): Status: ACTIVE | Noted: 2024-12-19

## 2024-12-19 PROBLEM — O99.820 GBS (GROUP B STREPTOCOCCUS CARRIER), +RV CULTURE, CURRENTLY PREGNANT (HCC): Status: ACTIVE | Noted: 2024-12-19

## 2024-12-19 PROBLEM — Z98.890 STATUS POST INDUCTION OF LABOR: Status: ACTIVE | Noted: 2024-12-19

## 2024-12-19 LAB
ANTIBODY SCREEN: NEGATIVE
BASOPHILS # BLD AUTO: 0.02 X10(3) UL (ref 0–0.2)
BASOPHILS NFR BLD AUTO: 0.3 %
EOSINOPHIL # BLD AUTO: 0.04 X10(3) UL (ref 0–0.7)
EOSINOPHIL NFR BLD AUTO: 0.5 %
ERYTHROCYTE [DISTWIDTH] IN BLOOD BY AUTOMATED COUNT: 14.6 %
EST. AVERAGE GLUCOSE BLD GHB EST-MCNC: 117 MG/DL (ref 68–126)
GLUCOSE BLD-MCNC: 78 MG/DL (ref 70–99)
HBA1C MFR BLD: 5.7 % (ref ?–5.7)
HCT VFR BLD AUTO: 39.6 %
HGB BLD-MCNC: 13.8 G/DL
IMM GRANULOCYTES # BLD AUTO: 0.04 X10(3) UL (ref 0–1)
IMM GRANULOCYTES NFR BLD: 0.5 %
LYMPHOCYTES # BLD AUTO: 1.46 X10(3) UL (ref 1–4)
LYMPHOCYTES NFR BLD AUTO: 19.3 %
MCH RBC QN AUTO: 31.9 PG (ref 26–34)
MCHC RBC AUTO-ENTMCNC: 34.8 G/DL (ref 31–37)
MCV RBC AUTO: 91.5 FL
MONOCYTES # BLD AUTO: 0.51 X10(3) UL (ref 0.1–1)
MONOCYTES NFR BLD AUTO: 6.7 %
NEUTROPHILS # BLD AUTO: 5.49 X10 (3) UL (ref 1.5–7.7)
NEUTROPHILS # BLD AUTO: 5.49 X10(3) UL (ref 1.5–7.7)
NEUTROPHILS NFR BLD AUTO: 72.7 %
PLATELET # BLD AUTO: 183 10(3)UL (ref 150–450)
RBC # BLD AUTO: 4.33 X10(6)UL
RH BLOOD TYPE: POSITIVE
T PALLIDUM AB SER QL IA: NONREACTIVE
WBC # BLD AUTO: 7.6 X10(3) UL (ref 4–11)

## 2024-12-19 PROCEDURE — 3E033VJ INTRODUCTION OF OTHER HORMONE INTO PERIPHERAL VEIN, PERCUTANEOUS APPROACH: ICD-10-PCS | Performed by: OBSTETRICS & GYNECOLOGY

## 2024-12-19 RX ORDER — ONDANSETRON 2 MG/ML
4 INJECTION INTRAMUSCULAR; INTRAVENOUS EVERY 6 HOURS PRN
Status: DISCONTINUED | OUTPATIENT
Start: 2024-12-19 | End: 2024-12-20 | Stop reason: HOSPADM

## 2024-12-19 RX ORDER — TERBUTALINE SULFATE 1 MG/ML
0.25 INJECTION, SOLUTION SUBCUTANEOUS AS NEEDED
Status: COMPLETED | OUTPATIENT
Start: 2024-12-19 | End: 2024-12-19

## 2024-12-19 RX ORDER — LIDOCAINE HYDROCHLORIDE AND EPINEPHRINE 15; 5 MG/ML; UG/ML
5 INJECTION, SOLUTION EPIDURAL AS NEEDED
Status: DISCONTINUED | OUTPATIENT
Start: 2024-12-19 | End: 2024-12-20

## 2024-12-19 RX ORDER — ACETAMINOPHEN 500 MG
1000 TABLET ORAL EVERY 6 HOURS PRN
Status: DISCONTINUED | OUTPATIENT
Start: 2024-12-19 | End: 2024-12-20 | Stop reason: HOSPADM

## 2024-12-19 RX ORDER — LIDOCAINE HYDROCHLORIDE 20 MG/ML
5 INJECTION, SOLUTION EPIDURAL; INFILTRATION; INTRACAUDAL; PERINEURAL AS NEEDED
Status: DISCONTINUED | OUTPATIENT
Start: 2024-12-19 | End: 2024-12-20

## 2024-12-19 RX ORDER — NALBUPHINE HYDROCHLORIDE 10 MG/ML
2.5 INJECTION INTRAMUSCULAR; INTRAVENOUS; SUBCUTANEOUS
Status: DISCONTINUED | OUTPATIENT
Start: 2024-12-19 | End: 2024-12-20

## 2024-12-19 RX ORDER — DEXTROSE, SODIUM CHLORIDE, SODIUM LACTATE, POTASSIUM CHLORIDE, AND CALCIUM CHLORIDE 5; .6; .31; .03; .02 G/100ML; G/100ML; G/100ML; G/100ML; G/100ML
INJECTION, SOLUTION INTRAVENOUS AS NEEDED
Status: DISCONTINUED | OUTPATIENT
Start: 2024-12-19 | End: 2024-12-20 | Stop reason: HOSPADM

## 2024-12-19 RX ORDER — BUPIVACAINE HYDROCHLORIDE 2.5 MG/ML
30 INJECTION, SOLUTION EPIDURAL; INFILTRATION; INTRACAUDAL AS NEEDED
Status: DISCONTINUED | OUTPATIENT
Start: 2024-12-19 | End: 2024-12-20

## 2024-12-19 RX ORDER — BUPIVACAINE HYDROCHLORIDE 2.5 MG/ML
INJECTION, SOLUTION EPIDURAL; INFILTRATION; INTRACAUDAL AS NEEDED
Status: DISCONTINUED | OUTPATIENT
Start: 2024-12-19 | End: 2024-12-20 | Stop reason: SURG

## 2024-12-19 RX ORDER — CITRIC ACID/SODIUM CITRATE 334-500MG
30 SOLUTION, ORAL ORAL AS NEEDED
Status: COMPLETED | OUTPATIENT
Start: 2024-12-19 | End: 2024-12-20

## 2024-12-19 RX ORDER — LIDOCAINE HYDROCHLORIDE AND EPINEPHRINE 15; 5 MG/ML; UG/ML
INJECTION, SOLUTION EPIDURAL AS NEEDED
Status: DISCONTINUED | OUTPATIENT
Start: 2024-12-19 | End: 2024-12-20 | Stop reason: SURG

## 2024-12-19 RX ORDER — IBUPROFEN 600 MG/1
600 TABLET, FILM COATED ORAL ONCE AS NEEDED
Status: DISCONTINUED | OUTPATIENT
Start: 2024-12-19 | End: 2024-12-20 | Stop reason: HOSPADM

## 2024-12-19 RX ORDER — ACETAMINOPHEN 500 MG
500 TABLET ORAL EVERY 6 HOURS PRN
Status: DISCONTINUED | OUTPATIENT
Start: 2024-12-19 | End: 2024-12-20 | Stop reason: HOSPADM

## 2024-12-19 RX ORDER — BUPIVACAINE HCL/0.9 % NACL/PF 0.25 %
5 PLASTIC BAG, INJECTION (ML) EPIDURAL AS NEEDED
Status: DISCONTINUED | OUTPATIENT
Start: 2024-12-19 | End: 2024-12-20

## 2024-12-19 RX ORDER — SODIUM CHLORIDE, SODIUM LACTATE, POTASSIUM CHLORIDE, CALCIUM CHLORIDE 600; 310; 30; 20 MG/100ML; MG/100ML; MG/100ML; MG/100ML
INJECTION, SOLUTION INTRAVENOUS CONTINUOUS
Status: DISCONTINUED | OUTPATIENT
Start: 2024-12-19 | End: 2024-12-20 | Stop reason: HOSPADM

## 2024-12-19 RX ORDER — SODIUM CHLORIDE 9 MG/ML
10 INJECTION, SOLUTION INTRAMUSCULAR; INTRAVENOUS; SUBCUTANEOUS AS NEEDED
Status: DISCONTINUED | OUTPATIENT
Start: 2024-12-19 | End: 2024-12-20

## 2024-12-19 RX ADMIN — BUPIVACAINE HYDROCHLORIDE 10 ML: 2.5 INJECTION, SOLUTION EPIDURAL; INFILTRATION; INTRACAUDAL at 18:04:00

## 2024-12-19 RX ADMIN — LIDOCAINE HYDROCHLORIDE AND EPINEPHRINE 3 ML: 15; 5 INJECTION, SOLUTION EPIDURAL at 18:01:00

## 2024-12-19 RX ADMIN — BUPIVACAINE HYDROCHLORIDE 7 ML: 2.5 INJECTION, SOLUTION EPIDURAL; INFILTRATION; INTRACAUDAL at 22:50:00

## 2024-12-19 RX ADMIN — BUPIVACAINE HYDROCHLORIDE 5 ML: 2.5 INJECTION, SOLUTION EPIDURAL; INFILTRATION; INTRACAUDAL at 22:11:00

## 2024-12-19 NOTE — TELEPHONE ENCOUNTER
Patient stated someone called her in regards to her scheduled induction today. Patient stated she was on hold and then they told her they would call her back but she has not heard anything from them?  Patient wants to know how to proceed  Please advise

## 2024-12-19 NOTE — ANESTHESIA PREPROCEDURE EVALUATION
PRE-OP EVALUATION    Patient Name: Ciara Che    Admit Diagnosis: Pregnancy (HCC) [Z34.90]    Pre-op Diagnosis: * No surgery found *        Anesthesia Procedure: INDUCTION    * Surgery not found *    Pre-op vitals reviewed.  Temp: 97.6 °F (36.4 °C)  Pulse: 97  Resp: 16  BP: 120/62  SpO2: 98 %  Body mass index is 28.53 kg/m².    Current medications reviewed.  Hospital Medications:   lactated ringers infusion   Intravenous Continuous    dextrose in lactated ringers 5% infusion   Intravenous PRN    lactated ringers IV bolus 500 mL  500 mL Intravenous PRN    acetaminophen (Tylenol Extra Strength) tab 500 mg  500 mg Oral Q6H PRN    acetaminophen (Tylenol Extra Strength) tab 1,000 mg  1,000 mg Oral Q6H PRN    ibuprofen (Motrin) tab 600 mg  600 mg Oral Once PRN    ondansetron (Zofran) 4 MG/2ML injection 4 mg  4 mg Intravenous Q6H PRN    oxyTOCIN in sodium chloride 0.9% (Pitocin) 30 Units/500mL infusion premix  62.5-900 tiana-units/min Intravenous Continuous    terbutaline (Brethine) 1 MG/ML injection 0.25 mg  0.25 mg Subcutaneous PRN    sodium citrate-citric acid (Bicitra) 500-334 MG/5ML oral solution 30 mL  30 mL Oral PRN    [COMPLETED] ampicillin (Omnipen) 2 g in sodium chloride 0.9% 100 mL IVPB-MBP  2 g Intravenous Once    Followed by    ampicillin (Omnipen) 1 g in sodium chloride 0.9% 100 mL IVPB-MBP  1 g Intravenous Q4H    oxyTOCIN in sodium chloride 0.9% (Pitocin) 30 Units/500mL infusion premix  0.5-20 tiana-units/min Intravenous Continuous    lactated ringers IV bolus 1,000 mL  1,000 mL Intravenous Once    fentaNYL-bupivacaine 2 mcg/mL-0.125% in sodium chloride 0.9% 100 mL EPIDURAL infusion premix  12 mL/hr Epidural Continuous    fentaNYL (Sublimaze) 50 mcg/mL injection 100 mcg  100 mcg Epidural Once    lidocaine 1.5%-EPINEPHrine 1:200,000 (Xylocaine-Epinephrine) injection  5 mL Injection PRN    bupivacaine PF (Marcaine) 0.25% injection  30 mL Injection PRN    lidocaine PF (Xylocaine-MPF) 2% injection  5 mL  Injection PRN    sodium chloride 0.9% PF injection 10 mL  10 mL Injection PRN    ePHEDrine (PF) 25 MG/5 ML injection 5 mg  5 mg Intravenous PRN    nalbuphine (Nubain) 10 mg/mL injection 2.5 mg  2.5 mg Intravenous Q15 Min PRN       Outpatient Medications:   Prescriptions Prior to Admission[1]    Allergies: Patient has no known allergies.      Anesthesia Evaluation        Anesthetic Complications           GI/Hepatic/Renal    Negative GI/hepatic/renal ROS.                             Cardiovascular    Negative cardiovascular ROS.    Exercise tolerance: good     MET: >4                                           Endo/Other      (+) diabetes and well controlled, gestational, not using insulin                         Pulmonary    Negative pulmonary ROS.                       Neuro/Psych    Negative neuro/psych ROS.                                  History reviewed. No pertinent surgical history.  Social History     Socioeconomic History    Marital status:    Tobacco Use    Smoking status: Never    Smokeless tobacco: Never   Vaping Use    Vaping status: Never Used   Substance and Sexual Activity    Alcohol use: Not Currently    Drug use: Never    Sexual activity: Yes     Partners: Male   Other Topics Concern    Caffeine Concern No    Exercise No    Seat Belt No    Special Diet No    Stress Concern No    Weight Concern No     History   Drug Use Unknown     Available pre-op labs reviewed.  Lab Results   Component Value Date    WBC 7.6 12/19/2024    RBC 4.33 12/19/2024    HGB 13.8 12/19/2024    HCT 39.6 12/19/2024    MCV 91.5 12/19/2024    MCH 31.9 12/19/2024    MCHC 34.8 12/19/2024    RDW 14.6 12/19/2024    .0 12/19/2024     Lab Results   Component Value Date    GLU 78 12/19/2024            Airway      Mallampati: III  Mouth opening: >3 FB  TM distance: 4 - 6 cm  Neck ROM: full Cardiovascular    Cardiovascular exam normal.         Dental    Dentition appears grossly intact         Pulmonary    Pulmonary exam  normal.                 Other findings              ASA: 2   Plan: epidural  NPO status verified and patient meets guidelines.          Plan/risks discussed with: patient                Present on Admission:   Clomid pregnancy in third trimester ([facility])   39 weeks gestation of pregnancy ([facility])   Diet controlled gestational diabetes mellitus (GDM) in third trimester ([facility])   GBS (group B Streptococcus carrier), +RV culture, currently pregnant ([facility])             [1]   Medications Prior to Admission   Medication Sig Dispense Refill Last Dose/Taking    prenatal vitamin with DHA 27-0.8-228 MG Oral Cap Take 1 capsule by mouth daily.   2024    [] Blood Glucose Monitoring Suppl (ONETOUCH VERIO FLEX SYSTEM) w/Device Does not apply Kit 1 kit one time for 1 dose. Gestational diabetes, check 4 times per day. May substitute if not on insurance formulary 1 kit 0     Glucose Blood (ONETOUCH VERIO) In Vitro Strip Gestational diabetes, check 4 times per day. May substitute if not on insurance formulary 100 strip 3     Lancets (ONETOUCH DELICA PLUS [id number]) Does not apply Misc 1 Lancet 4 (four) times daily. Gestational diabetes, check 4 times per day. May substitute if not on insurance formulary 100 each 3

## 2024-12-19 NOTE — PLAN OF CARE
Problem: BIRTH - VAGINAL/ SECTION  Goal: Fetal and maternal status remain reassuring during the birth process  Description: INTERVENTIONS:  - Monitor vital signs  - Monitor fetal heart rate  - Monitor uterine activity  - Monitor labor progression (vaginal delivery)  - DVT prophylaxis (C/S delivery)  - Surgical antibiotic prophylaxis (C/S delivery)  Outcome: Progressing     Problem: PAIN - ADULT  Goal: Verbalizes/displays adequate comfort level or patient's stated pain goal  Description: INTERVENTIONS:  - Encourage pt to monitor pain and request assistance  - Assess pain using appropriate pain scale  - Administer analgesics based on type and severity of pain and evaluate response  - Implement non-pharmacological measures as appropriate and evaluate response  - Consider cultural and social influences on pain and pain management  - Manage/alleviate anxiety  - Utilize distraction and/or relaxation techniques  - Monitor for opioid side effects  - Notify MD/LIP if interventions unsuccessful or patient reports new pain  - Anticipate increased pain with activity and pre-medicate as appropriate  Outcome: Progressing     Problem: ANXIETY  Goal: Will report anxiety at manageable levels  Description: INTERVENTIONS:  - Administer medication as ordered  - Teach and rehearse alternative coping skills  - Provide emotional support with 1:1 interaction with staff  Outcome: Progressing     Problem: Patient/Family Goals  Goal: Patient/Family Long Term Goal  Description: Patient's Long Term Goal: safe delivery of infant      - See additional Care Plan goals for specific interventions  Outcome: Progressing  Goal: Patient/Family Short Term Goal  Description: Patient's Short Term Goal: adequate pain management    - See additional Care Plan goals for specific interventions  Outcome: Progressing

## 2024-12-19 NOTE — TELEPHONE ENCOUNTER
Juan LEARY will call pt once they are able to accommodate pt for her IOL. Patient can reach out to LD if they have further questions. Patient verbalized understanding, agreed to and intend to comply with plan of care.

## 2024-12-19 NOTE — H&P
[facility]   part of Located within [facility]    History & Physical    Ciara Che Patient Status:  Inpatient    1995 MRN [id number]   Location [facility] LABOR & DELIVERY Attending Iraida Irvin MD   Hosp Day # 0 PCP None Pcp     Date of Admission: 2024  1:53 PM       HPI:   Ciara Che is a 29 year old  at 39w0d admitted for induction of labor for spontaneous rupture of membranes 2024 at 1330. Was scheduled for IOL today for diet controlled gestational diabetes. Clomid pregnancy. Irregular menses. Patient having some contractions on the monitor, but not feeling them. +FM. No vaginal bleeding.     Growth US done 24, normal EFW 56%ile, AC 93%ile, normal fluid, vertex   RSV vaccine 10/31/24     +FM. No contractions, leaking fluid, vaginal bleeding,     History   Obstetric History:   OB History    Para Term  AB Living   2       1     SAB IAB Ectopic Multiple Live Births   1              # Outcome Date GA Lbr Gilson/2nd Weight Sex Type Anes PTL Lv   2 Current            1 SAB 23 6w4d    SAB   FD      Birth Comments: 6w4d IUFD by CRL. HCG 16,286 -> falling with cramping & bleeding.      Obstetric Comments   Current - Clomid pregnancy. Irregular menses. Diet controlled gestational diabetes.      Past Medical History:   Past Medical History:   Diagnosis Date    Gestational diabetes (HCC) 2024    diet controlled    Irregular menses 2023    Since prior to 3/2023    Screening for genetic disease carrier status 2024    [employer] Carrier Screen = NEGATIVE FOR 14 OUT OF 14 DISEASES      Past Social History: History reviewed. No pertinent surgical history.  Family History:   Family History   Problem Relation Age of Onset    Diabetes Neg     Thyroid disease Neg     Birth Defects Neg     Genetic Disease Neg      Social History:   Social History     Tobacco Use    Smoking status: Never    Smokeless tobacco: Never   Substance Use Topics    Alcohol use: Not Currently         Allergies/Medications:   Allergies:   Allergies[1]  Medications:  Prescriptions Prior to Admission[2]    Review of Systems:   As documented in HPI    Physical Exam:   Temp:  [97.6 °F (36.4 °C)] 97.6 °F (36.4 °C)  Pulse:  [] 123  Resp:  [16] 16  BP: ()/(46-92) 97/48  SpO2:  [96 %-99 %] 98 %    Vitals:    24 1850 24 1851 24 1855 24 1900   BP:  (!) 86/52 123/46 97/48   Pulse: 95 101 109 (!) 123   Resp:       Temp:       TempSrc:       SpO2: 98%  98% 98%   Weight:       Height:           Estimated body mass index is 28.53 kg/m² as calculated from the following:    Height as of this encounter: 61\".    Weight as of this encounter: 151 lb (68.5 kg).     On admission   FHT: 145 bpm mod chente, +accels  [place] 4-7 min apart   SVE 3/80/-2 per RN exam 1526     Immunization History   Administered Date(s) Administered    FLUZONE 6 months and older PFS 0.5 ml (55290) 2020    Influenza 2018    Influenza Vaccine, trivalent (IIV3), PF 0.5mL (34055) 10/02/2024    RSV, bivalent, diluent reconstituted PF (Abrysvo) 10/31/2024    TDAP 10/02/2024       Results:     Component      Latest Ref Rng 2024  3:16 PM 2024  3:17 PM   WBC      4.0 - 11.0 x10(3) uL  7.6    RBC      3.80 - 5.30 x10(6)uL  4.33    Hemoglobin      12.0 - 16.0 g/dL  13.8    Hematocrit      35.0 - 48.0 %  39.6    Platelet Count      150.0 - 450.0 10(3)uL  183.0    MCV      80.0 - 100.0 fL  91.5    ABO BLOOD TYPE A     RH Factor Positive     HEMOGLOBIN A1c      <5.7 %  5.7 (H)    ESTIMATED AVERAGE GLUCOSE      68 - 126 mg/dL  117    TREPONEMA PALLIDUM AB, PARTICLE AGGLUTINATION      Nonreactive       ANTIBODY SCREEN Negative     Glucose      70 - 99 mg/dL       Component      Latest Ref Rng 2024  3:18 PM   TREPONEMA PALLIDUM AB, PARTICLE AGGLUTINATION      Nonreactive   Nonreactive    ANTIBODY SCREEN    Glucose      70 - 99 mg/dL 78       Assessment/Plan:    Ciara Che 29 year old  at 39w0d - Here  for spontaneous rupture of membranes. Was scheduled for induction of labor for diet controlled GDM. Clomid pregnancy.      +FWB    SROM  -Augmentation with IV oxytocin     Diet controlled GDM  -glucose on admission 78 mg/dL  -A1c 5.7% (elevated)     A+/GBS+, plan ampicillin  Epidural PRN    Iraida Irvin MD    Note to patient and family:  The  Cures Act makes medical notes available to patients in the interest of transparency.  However, please be advised that this is a medical document.  It is intended as a peer to peer communication.  It is written in medical language and may contain abbreviations or verbiage that are technical and unfamiliar.  It may appear blunt or direct.  Medical documents are intended to carry relevant information, facts as evident, and the clinical opinion of the practitioner.         [1] No Known Allergies  [2]   Medications Prior to Admission   Medication Sig Dispense Refill Last Dose/Taking    prenatal vitamin with DHA 27-0.8-228 MG Oral Cap Take 1 capsule by mouth daily.   2024    [] Blood Glucose Monitoring Suppl (ONETOUCH VERIO FLEX SYSTEM) w/Device Does not apply Kit 1 kit one time for 1 dose. Gestational diabetes, check 4 times per day. May substitute if not on insurance formulary 1 kit 0     Glucose Blood (ONETOUCH VERIO) In Vitro Strip Gestational diabetes, check 4 times per day. May substitute if not on insurance formulary 100 strip 3     Lancets (ONETOUCH DELICA PLUS [id number]) Does not apply Misc 1 Lancet 4 (four) times daily. Gestational diabetes, check 4 times per day. May substitute if not on insurance formulary 100 each 3

## 2024-12-20 PROBLEM — O42.90 AMNIOTIC FLUID LEAKING (HCC): Status: ACTIVE | Noted: 2024-12-19

## 2024-12-20 PROBLEM — O41.1230 CHORIOAMNIONITIS IN THIRD TRIMESTER (HCC): Status: ACTIVE | Noted: 2024-12-20

## 2024-12-20 PROBLEM — Z87.59 HISTORY OF MISCARRIAGE: Status: ACTIVE | Noted: 2024-06-19

## 2024-12-20 PROBLEM — Z98.890 STATUS POST INDUCTION OF LABOR: Status: RESOLVED | Noted: 2024-12-19 | Resolved: 2024-12-20

## 2024-12-20 PROBLEM — Z98.891 STATUS POST PRIMARY LOW TRANSVERSE CESAREAN SECTION: Status: ACTIVE | Noted: 2024-12-20

## 2024-12-20 PROBLEM — O36.8390 FETAL TACHYCARDIA AFFECTING MANAGEMENT OF MOTHER (HCC): Status: ACTIVE | Noted: 2024-12-20

## 2024-12-20 PROCEDURE — 59514 CESAREAN DELIVERY ONLY: CPT | Performed by: OBSTETRICS & GYNECOLOGY

## 2024-12-20 PROCEDURE — 59510 CESAREAN DELIVERY: CPT | Performed by: OBSTETRICS & GYNECOLOGY

## 2024-12-20 RX ORDER — LIDOCAINE HCL/EPINEPHRINE/PF 2%-1:200K
VIAL (ML) INJECTION AS NEEDED
Status: DISCONTINUED | OUTPATIENT
Start: 2024-12-20 | End: 2024-12-20 | Stop reason: SURG

## 2024-12-20 RX ORDER — HYDROMORPHONE HYDROCHLORIDE 1 MG/ML
0.3 INJECTION, SOLUTION INTRAMUSCULAR; INTRAVENOUS; SUBCUTANEOUS EVERY 2 HOUR PRN
Status: DISCONTINUED | OUTPATIENT
Start: 2024-12-20 | End: 2024-12-23

## 2024-12-20 RX ORDER — DEXTROSE, SODIUM CHLORIDE, SODIUM LACTATE, POTASSIUM CHLORIDE, AND CALCIUM CHLORIDE 5; .6; .31; .03; .02 G/100ML; G/100ML; G/100ML; G/100ML; G/100ML
INJECTION, SOLUTION INTRAVENOUS CONTINUOUS PRN
Status: DISCONTINUED | OUTPATIENT
Start: 2024-12-20 | End: 2024-12-23

## 2024-12-20 RX ORDER — ONDANSETRON 2 MG/ML
4 INJECTION INTRAMUSCULAR; INTRAVENOUS EVERY 6 HOURS PRN
Status: DISCONTINUED | OUTPATIENT
Start: 2024-12-20 | End: 2024-12-23

## 2024-12-20 RX ORDER — KETOROLAC TROMETHAMINE 30 MG/ML
30 INJECTION, SOLUTION INTRAMUSCULAR; INTRAVENOUS EVERY 6 HOURS
Status: DISPENSED | OUTPATIENT
Start: 2024-12-20 | End: 2024-12-21

## 2024-12-20 RX ORDER — SODIUM CHLORIDE, SODIUM LACTATE, POTASSIUM CHLORIDE, CALCIUM CHLORIDE 600; 310; 30; 20 MG/100ML; MG/100ML; MG/100ML; MG/100ML
INJECTION, SOLUTION INTRAVENOUS CONTINUOUS PRN
Status: DISCONTINUED | OUTPATIENT
Start: 2024-12-20 | End: 2024-12-20 | Stop reason: SURG

## 2024-12-20 RX ORDER — ACETAMINOPHEN 500 MG
1000 TABLET ORAL ONCE
Status: COMPLETED | OUTPATIENT
Start: 2024-12-20 | End: 2024-12-20

## 2024-12-20 RX ORDER — BISACODYL 10 MG
10 SUPPOSITORY, RECTAL RECTAL ONCE AS NEEDED
Status: DISCONTINUED | OUTPATIENT
Start: 2024-12-20 | End: 2024-12-23

## 2024-12-20 RX ORDER — KETOROLAC TROMETHAMINE 30 MG/ML
INJECTION, SOLUTION INTRAMUSCULAR; INTRAVENOUS
Status: COMPLETED
Start: 2024-12-20 | End: 2024-12-20

## 2024-12-20 RX ORDER — DEXAMETHASONE SODIUM PHOSPHATE 4 MG/ML
VIAL (ML) INJECTION AS NEEDED
Status: DISCONTINUED | OUTPATIENT
Start: 2024-12-20 | End: 2024-12-20 | Stop reason: SURG

## 2024-12-20 RX ORDER — SIMETHICONE 80 MG
80 TABLET,CHEWABLE ORAL 3 TIMES DAILY PRN
Status: DISCONTINUED | OUTPATIENT
Start: 2024-12-20 | End: 2024-12-23

## 2024-12-20 RX ORDER — OXYCODONE HYDROCHLORIDE 5 MG/1
5 TABLET ORAL EVERY 6 HOURS PRN
Status: DISCONTINUED | OUTPATIENT
Start: 2024-12-20 | End: 2024-12-23

## 2024-12-20 RX ORDER — DOCUSATE SODIUM 100 MG/1
100 CAPSULE, LIQUID FILLED ORAL
Status: DISCONTINUED | OUTPATIENT
Start: 2024-12-20 | End: 2024-12-23

## 2024-12-20 RX ORDER — METHYLERGONOVINE MALEATE 0.2 MG/ML
INJECTION INTRAVENOUS AS NEEDED
Status: DISCONTINUED | OUTPATIENT
Start: 2024-12-20 | End: 2024-12-20 | Stop reason: SURG

## 2024-12-20 RX ORDER — METHYLERGONOVINE MALEATE 0.2 MG/ML
INJECTION INTRAVENOUS
Status: DISPENSED
Start: 2024-12-20 | End: 2024-12-20

## 2024-12-20 RX ORDER — MORPHINE SULFATE 2 MG/ML
INJECTION, SOLUTION INTRAMUSCULAR; INTRAVENOUS AS NEEDED
Status: DISCONTINUED | OUTPATIENT
Start: 2024-12-20 | End: 2024-12-20 | Stop reason: SURG

## 2024-12-20 RX ORDER — ONDANSETRON 2 MG/ML
INJECTION INTRAMUSCULAR; INTRAVENOUS AS NEEDED
Status: DISCONTINUED | OUTPATIENT
Start: 2024-12-20 | End: 2024-12-20 | Stop reason: SURG

## 2024-12-20 RX ORDER — GABAPENTIN 300 MG/1
300 CAPSULE ORAL EVERY 8 HOURS PRN
Status: DISCONTINUED | OUTPATIENT
Start: 2024-12-20 | End: 2024-12-23

## 2024-12-20 RX ORDER — ACETAMINOPHEN 500 MG
1000 TABLET ORAL EVERY 6 HOURS
Status: DISCONTINUED | OUTPATIENT
Start: 2024-12-20 | End: 2024-12-23

## 2024-12-20 RX ORDER — IBUPROFEN 600 MG/1
600 TABLET, FILM COATED ORAL EVERY 6 HOURS
Status: DISCONTINUED | OUTPATIENT
Start: 2024-12-21 | End: 2024-12-23

## 2024-12-20 RX ADMIN — LIDOCAINE HCL/EPINEPHRINE/PF 15 ML: 2%-1:200K VIAL (ML) INJECTION at 02:17:00

## 2024-12-20 RX ADMIN — MORPHINE SULFATE 2 MG: 2 INJECTION, SOLUTION INTRAMUSCULAR; INTRAVENOUS at 02:44:00

## 2024-12-20 RX ADMIN — LIDOCAINE HCL/EPINEPHRINE/PF 5 ML: 2%-1:200K VIAL (ML) INJECTION at 02:21:00

## 2024-12-20 RX ADMIN — DEXAMETHASONE SODIUM PHOSPHATE 4 MG: 4 MG/ML VIAL (ML) INJECTION at 02:15:00

## 2024-12-20 RX ADMIN — METHYLERGONOVINE MALEATE 0.2 MG: 0.2 INJECTION INTRAVENOUS at 03:37:00

## 2024-12-20 RX ADMIN — ONDANSETRON 8 MG: 2 INJECTION INTRAMUSCULAR; INTRAVENOUS at 02:15:00

## 2024-12-20 RX ADMIN — SODIUM CHLORIDE, SODIUM LACTATE, POTASSIUM CHLORIDE, CALCIUM CHLORIDE: 600; 310; 30; 20 INJECTION, SOLUTION INTRAVENOUS at 02:12:00

## 2024-12-20 NOTE — PROGRESS NOTES
Pt is a 29 year old female admitted to 113/113-A.     Chief Complaint   Patient presents with    Scheduled Induction     SROM at 1330 clear fluid        Pt is  39w0d intra-uterine pregnancy.  History obtained, consents signed. Oriented to room, staff, and plan of care.     services used: [id number]

## 2024-12-20 NOTE — PROGRESS NOTES
OB attending    Called by RN due to prolonged fetal heart deceleration      bpm, mod chente, +early decelerations  Then a deep variable to the 60s followed by decel to 60s x 9 min approximately  Just prior to and during this deceleration, there was tachysystole with contractions every 1.5-2 minutes and patient was on her back for Bowen catheter insertion following epidural  BP was down to 80s/50s      Pitocin was stopped  Repositioned multiple times  IV fluid bolus   Terbutaline given per RN  Cervix 5 cm/90/-1 per RN     As I was arriving to L&D, fetal heart rate was recovering and was around 120 bpm    New baseline 145 bpm, moderate variability with subtle early decelerations   Contractions about 4-5 minutes     Will let fetus recover & then restart pitocin if needed.     Iraida Irvin MD

## 2024-12-20 NOTE — ANESTHESIA POSTPROCEDURE EVALUATION
[facility]    Caira Che Patient Status:  Inpatient   Age/Gender 29 year old female MRN [id number]   Location [facility] LABOR & DELIVERY Attending Iraida Irvin MD   Hosp Day # 1 PCP None Pcp       Anesthesia Post-op Note        Procedure Summary       Date: 12/19/24 Room / Location: [facility] Obstetrics    Anesthesia Start: 1750 Anesthesia Stop: 12/20/24 0343    Procedure: INDUCTION Diagnosis:     Scheduled Providers:  Anesthesiologist: Hola Patel MD    Anesthesia Type: epidural ASA Status: 2            Anesthesia Type: epidural    Vitals Value Taken Time   /84 12/20/24 0342   Temp 98.2 12/20/24 0344   Pulse 118 12/20/24 0344   Resp 21 12/20/24 0343   SpO2 97 % 12/20/24 0344   Vitals shown include unfiled device data.    Patient Location: Labor and Delivery    Anesthesia Type: epidural    Airway Patency: patent    Postop Pain Control: adequate    Mental Status: preanesthetic baseline    Nausea/Vomiting: none    Cardiopulmonary/Hydration status: stable euvolemic    Complications: no apparent anesthesia related complications    Postop vital signs: stable    Dental Exam: Unchanged from Preop    Patient to be discharged from PACU when criteria met.

## 2024-12-20 NOTE — PLAN OF CARE
NURSING ADMISSION NOTE      Patient admitted via Cart  Oriented to room.  Safety precautions initiated.  Bed in low position.  Call light in reach.    Pt admitted to MB, ID bands checked with GIBRAN RN.    Problem: GASTROINTESTINAL - ADULT  Goal: Minimal or absence of nausea and vomiting  Description: INTERVENTIONS:  - Maintain adequate hydration with IV or PO as ordered and tolerated  - Nasogastric tube to low intermittent suction as ordered  - Evaluate effectiveness of ordered antiemetic medications  - Provide nonpharmacologic comfort measures as appropriate  - Advance diet as tolerated, if ordered  - Obtain nutritional consult as needed  - Evaluate fluid balance  Outcome: Progressing  Goal: Maintains or returns to baseline bowel function  Description: INTERVENTIONS:  - Assess bowel function  - Maintain adequate hydration with IV or PO as ordered and tolerated  - Evaluate effectiveness of GI medications  - Encourage mobilization and activity  - Obtain nutritional consult as needed  - Establish a toileting routine/schedule  - Consider collaborating with pharmacy to review patient's medication profile  Outcome: Progressing  Goal: Maintains adequate nutritional intake (undernourished)  Description: INTERVENTIONS:  - Monitor percentage of each meal consumed  - Identify factors contributing to decreased intake, treat as appropriate  - Assist with meals as needed  - Monitor I&O, WT and lab values  - Obtain nutritional consult as needed  - Optimize oral hygiene and moisture  - Encourage food from home; allow for food preferences  - Enhance eating environment  Outcome: Progressing  Goal: Achieves appropriate nutritional intake (bariatric)  Description: INTERVENTIONS:  - Monitor for over-consumption  - Identify factors contributing to increased intake, treat as appropriate  - Monitor I&O, WT and lab values  - Obtain nutritional consult as needed  - Evaluate psychosocial factors contributing to over-consumption  Outcome:  Progressing

## 2024-12-20 NOTE — L&D DELIVERY NOTE
Gilbert Che [[id number]]      Labor Events     labor?: No   steroids?: None  Antibiotics received during labor?: Yes  Antibiotics (enter # doses in comment): ampicillin, cefazolin, other (Comment: x3 doses, azithromycin)  Rupture date/time: 2024 1330     Rupture type: SROM  Fluid color: Clear  Labor type: Spontaneous Onset of Labor  Augmentation: Oxytocin  Indications for augmentation: Ineffective Contraction Pattern  Intrapartum & labor complications: Late decelerations, Other - see comments, Group B beta strep +, Fetal tachycardia, Intraamniotic Infection  Intrapartum & labor complications comment: Diet controlled gestational diabetes, GBS carrier, fetal tachycardia, late decelerations, chorioamnionitis, occiput posterior       Labor Event Times    Labor onset date/time: 2024 1725  Dilation complete date/time: 2024 0055  Decision date/time (emergent ): 2024 0156        Presentation    Presentation: Vertex  Position: Occiput Posterior       Operative Delivery    Operative Vaginal Delivery: No                Shoulder Dystocia    Shoulder Dystocia: No       Anesthesia    Method: Epidural              Sharps Delivery      Head delivery date/time: 2024 02:41:39   Delivery date/time:  24 02:42:11   Delivery type: Caesarean Section    Details:   categorization: Primary    priority: unscheduled   Indications for : Fetal Intolerance of Labor   Skin incision type: 1 Pfannenstiel   Uterine Incision type: Low Transverse      Delivery location: OR       Delivery Providers    Delivering Clinician: Iraida Irvin MD   Delivery personnel:  Provider Role   Bridger Chandler RN Baby Nurse   Amina Peter RN Delivery Nurse   Mary Jane Dumont MD Neonatologist   Hola Patel MD OR/Delivery Anesthesiologist   Lela Layton MD              Cord    Vessels: 3 Vessels  Complications: None  Timed cord  clamping: Yes  Time in sec: 30  Cord blood disposition: to lab  Gases sent?: Yes       Resuscitation    Method: None        Measurements      Weight: 3620 g 7 lb 15.7 oz Length: 53.3 cm     Head circum.: 34 cm Chest circum.: 32.5 cm      Abdominal circum.: 31 cm           Placenta    Date/time: 2024 0243  Removal: Expressed  Appearance: Intact  Disposition: held for future pathology, Pathology       Apgars    Living status: Living   Apgar Scoring Key:    0 1 2    Skin color Blue or pale Acrocyanotic Completely pink    Heart rate Absent <100 bpm >100 bpm    Reflex irritability No response Grimace Cry or active withdrawal    Muscle tone Limp Some flexion Active motion    Respiratory effort Absent Weak cry; hypoventilation Good, crying              1 Minute:  5 Minute:  10 Minute:  15 Minute:  20 Minute:      Skin color: 1  1       Heart rate: 2  2       Reflex irritablity: 2  2       Muscle tone: 2  2       Respiratory effort: 2  2       Total: 9  9          Apgars assigned by: VERONICA   disposition: with mother       Skin to Skin    No data filed       Vaginal Count    Initial count RN: Amina Peter RN  Initial count Tech: Draguniene, Ausra   Sponges   Sharps    Initial counts 11   0    Final counts               Lacerations    Episiotomy: None  Perineal lacerations: None      Vaginal laceration?: No      Cervical laceration?: No    Clitoral laceration?: No    Quantitative blood loss (mL): 240            See operative report.

## 2024-12-20 NOTE — CM/SW NOTE
spoke to Mr Robert Cortes (spouse) to see if couple needed Chinese ? Mr Sebastian stated that he is very comfortable speaking in english and was parking the car to come and see if wife. He clarified when asked that he and his wife do have car seat and it is in the car.  stated that she would meet with couple when he arrived in patient room.

## 2024-12-20 NOTE — PROGRESS NOTES
Patient transferred to mother/baby room 2196 per cart in stable condition with baby and personal belongings.  Accompanied by  and staff.  Bands matched and verified with mother/baby RN and parents. Report given to mother/baby RN.

## 2024-12-20 NOTE — PROGRESS NOTES
OB attending    Progressed well in labor despite oxytocin discontinuation at 1849 on     Started to develop fetal tachycardia with moderate variability.  Intermittent variable & occasional late decelerations.   Some coupling/tripling of contractions noted. Some contractions 3-4 min long.     0043 - baseline 180 bpm, minimal variability  Patient complete/1+ per RN exam at 0055.   Started pushing at approx 0100    On my exam approximately 0150 or so, still has rim/lip of cervix anteriorly and right side. Fetal skull -1 station with mild to moderate caput. Cannot feel sutures well enough to determine fetal position but suspicious for OP given the contraction pattern.     Temp 100.5 with fetal & maternal tachycardia.   Suspected intra-amniotic infection/chorioamnionitis  Patient has been receiving IV ampicillin for GBS prophylaxis  Discussed with patient lack of fetal descent concerning for cephalopelvic disproportion and ongoing fetal tachycardia.   Recommend proceed with  section. Risks, benefits discussed. Patient in agreement.     Going to OR now  Will give IV cefazolin & azithromycin.   Will then dose with IV gentamicin & continue ampicillin for 24 hours after surgery.     Vitals:    24 2330 24 2345 24 0018 24 0154   BP: 125/55 131/60     Pulse: 120 (!) 128     Resp:       Temp:   99.6 °F (37.6 °C) (!) 100.5 °F (38.1 °C)   TempSrc:    Oral   SpO2: 96%      Weight:       Height:        Temp:  [97.6 °F (36.4 °C)-100.5 °F (38.1 °C)] 100.5 °F (38.1 °C)  Pulse:  [] 128  Resp:  [16-20] 20  BP: ()/(42-92) 131/60  SpO2:  [95 %-99 %] 96 %     Cervical Exam Data    Cervical Exam Data  Exam Time Dilation Station   0055 10 1   0018 9.5 0   24 2233 8.5 0   24 2115 7 -1   24 1847 5 -1   24 1526 3 -2     Iraida Irvin MD

## 2024-12-20 NOTE — PROGRESS NOTES
[facility] Pharmacy Dosing Service     Initial Pharmacokinetic Consult for Aminoglycoside Dosing     Ciara Che is a 29 year old patient who is being initiated on aminoglycoside therapy for  Chorioamnionitis . Pharmacy has been asked to dose gentamicin by Dr Irvin. The dosing approach will be treatment, and the initial strategy will be extended interval dosing.    Vitals:    Actual Body Weight:   Wt Readings from Last 1 Encounters:   24 68.5 kg (151 lb)     Ideal body weight: 47.8 kg (105 lb 6.1 oz)  Adjusted ideal body weight: 56.1 kg (123 lb 10 oz)    Temp Readings from Last 1 Encounters:   24 98.2 °F (36.8 °C) (Oral)        Labs:   No results for input(s): \"CREATSERUM\" in the last 168 hours.   CrCl cannot be calculated (Patient's most recent lab result is older than the maximum 7 days allowed.).   Recent Labs   Lab 24  1517   WBC 7.6        Renal Dosing Considerations:    None     Assessment/Plan:   Empiric dosing weight: actual body weight (ABW)    Initial dose: 340 mg (5 mg/kg) IV every 24 hours    Monitorin) Plan for aminoglycoside random level  should dosing exceed 48 hours.    2) Pharmacy will order SCr as clinically indicated to assess renal function.    3) Pharmacy will monitor for toxicity and efficacy, adjust aminoglycoside dose and/or frequency, and order levels as appropriate per the Pharmacy and Therapeutics Committee approved protocol until discontinuation of the medication.       We appreciate the opportunity to assist in the care of this patient     Ric Kurtz [facility]  2024  4:04 AM  [facility]: [phone]

## 2024-12-20 NOTE — PLAN OF CARE
Problem: BIRTH - VAGINAL/ SECTION  Goal: Fetal and maternal status remain reassuring during the birth process  Description: INTERVENTIONS:  - Monitor vital signs  - Monitor fetal heart rate  - Monitor uterine activity  - Monitor labor progression (vaginal delivery)  - DVT prophylaxis (C/S delivery)  - Surgical antibiotic prophylaxis (C/S delivery)  2024 by Amina Peter RN  Outcome: Completed  2024 by Amina Peter RN  Outcome: Progressing     Problem: Patient/Family Goals  Goal: Patient/Family Long Term Goal  Description: Patient's Long Term Goal: have safe delivery    Interventions:    - See additional Care Plan goals for specific interventions  2024 by Amina Peter RN  Outcome: Completed  2024 by Amina Peter RN  Outcome: Progressing  Goal: Patient/Family Short Term Goal  Description: Patient's Short Term Goal: adequate pain control    Interventions:   -- See additional Care Plan goals for specific interventions  2024 by Amina Peter RN  Outcome: Completed  2024 by Amina Peter RN  Outcome: Progressing

## 2024-12-20 NOTE — OPERATIVE REPORT
[facility]  Operative Note    Ciara Che Patient Status:  Inpatient    1995 MRN [id number]   Location [facility] LABOR & DELIVERY Attending Iraida Irvin MD   Hosp Day # 1 PCP None Pcp     Date of Procedure: 2024     Preoperative Diagnosis:  Intrauterine pregnancy at 39w1d   Gestational diabetes, diet controlled with HbA1c 5.7% on admission  Carrier of group B strep  Spontaneous rupture of membranes  Labor  Intra-amniotic infection/chorioamnionitis  Non reassuring fetal status remote from delivery   Suspected cephalopelvic disproportion    Postoperative Diagnosis:   Intrauterine pregnancy at 39w1d   Gestational diabetes, diet controlled with HbA1c 5.7% on admission  Carrier of group B strep  Spontaneous rupture of membranes  Labor  Intra-amniotic infection/chorioamnionitis  Non reassuring fetal status remote from delivery   Suspected cephalopelvic disproportion  Occiput posterior     Procedure: Primary Low Transverse  Section    Indications:  29 year old  female presented at 39w0d with complaint of spontaneous rupture of membranes 24 at 1330. She had been scheduled for induction of labor that same day for diet controlled gestational diabetes. Pregnancy complicated by irregular menses, Clomid to conceive, GBS carrier. Hemoglobin A1c 5.7% on admission.     She was erin but not uncomfortable. Cervix 3/80/-2 on admission. Augmentation with IV oxytocin was started. Ampicillin started for GBS carrier status. Epidural placed. IV oxytocin was discontinued and terbutaline x 1 dose was given for a prolonged deceleration that occurred while the patient was 5 cm lying on her back for Bowen catheter placement with relative hypotension and frequent contractions. This resolved. The patient required no further oxytocin and continued to progress in her labor.     Fetal tachycardia with intermittent variable and late decelerations developed. Some coupling/tripling of  contractions noted. Some contractions 3-4 min long. Patient was called completely dilated/+1 by one examiner and started pushing. Upon re-examination by a different examiner, a small rim of cervix was noted on the patient's right side and fetal skull felt to be -1 station with moderate caput to +1 station. Cervix unchanged from rim for 2+ hours.     Fever to 100.5 F noted along with maternal and fetal tachycardia consistent with intra-amniotic infection. Suspect fetus is not descending into pelvis due to possible cephalopelvic disproportion. Discussed with patient remote from delivery, recommend proceed with  section for non reassuring fetal status. Risks, benefits, alternatives discussed. Patient in agreement    Surgeon: Iraida Irvin MD       Assistant: Lela Layton MD, who was critical in ensuring adequate exposure for patient's safety and optimization of outcome. The assistant actively assisted in retraction, exposure, hemostasis, entry/closure as well as other essential operative technical functions.    Findings: Normal uterus, bilateral fallopian tubes, and bilateral ovaries. Clear amniotic fluid. Live male infant in direct occiput posterior presentation with weight: 3620 grams, 7 lb 15.7 oz, APGARs 9 & 9. Nuchal cord absent. Placenta: intact with 3 vessels.    Anesthesia: Epidural   Antibiotics: Cefazolin & azithromycin      QBL:  240 mL    Procedure:   Patient was brought to the operating room. Sequential compression devices and IV antibiotics were administered. After anesthetic was administered, the patient was prepped and draped in the usual sterile fashion. A Bowen catheter had been placed to drain the bladder. A time out was performed. After adequate level of anesthesia was ascertained, a Pfannenstiel incision was made and extended down to the level of the fascia. The fascia was incised and extended laterally with Rodriguez scissors.  The rectus muscles were  superiorly and  inferiorly.  The peritoneal cavity was entered superiorly and extended inferiorly.     An Uche-O self-retaining retractor was placed and the bladder flap was developed. A low transverse incision was created with a scalpel and blunt finger dissection. Amniotomy was performed with clear fluid. The fetus was delivered from a direct occiput posterior presentation. Delayed cord clamping was performed. The cord was clamped and cut. The infant was placed in the radiant warmer with Neonatology present.  Cord segment for gases & cord blood were obtained.  The placenta was delivered intact with a three vessel cord and sent to pathology. The uterine cavity was swept clean using a wet lap. The first layer of the hysterotomy was closed using 0 Vicryl.  A second imbricating layer was placed with 0 Vicryl.  The incision was inspected for hemostasis. Uterus, tubes and ovaries were normal in appearance.      The self retaining retractor was removed.  Re-inspection of the hysterotomy, peritoneum ,and rectus muscles revealed hemostasis. The parietal peritoneum was approximated with 3-0 Vicryl. The rectus muscles were re-approximated with 2-0 chromic in horizontal mattress suture. The fascia was closed with 0-Vicryl in a running fashion.  The subcutaneous tissue was irrigated and any bleeding cauterized.  The subcutaneous tissue was closed using 2-0 plain gut. The skin was closed with 4-0 Monocryl in a subcuticular fashion. The sponge and instrument counts were reported correct. RF mat performed and complete (negative). The patient tolerated the procedure and was stable to the recovery room.     Specimen: cord blood, cord segment for gases, placenta   Drains: urinary Bowen catheter to gravity, urine pink tinged at start of case, light yellow by end of case  Condition:  stable  Complications: None apparent     Iraida Irvin MD

## 2024-12-20 NOTE — PLAN OF CARE
Problem: BIRTH - VAGINAL/ SECTION  Goal: Fetal and maternal status remain reassuring during the birth process  Description: INTERVENTIONS:  - Monitor vital signs  - Monitor fetal heart rate  - Monitor uterine activity  - Monitor labor progression (vaginal delivery)  - DVT prophylaxis (C/S delivery)  - Surgical antibiotic prophylaxis (C/S delivery)  Outcome: Progressing     Problem: PAIN - ADULT  Goal: Verbalizes/displays adequate comfort level or patient's stated pain goal  Description: INTERVENTIONS:  - Encourage pt to monitor pain and request assistance  - Assess pain using appropriate pain scale  - Administer analgesics based on type and severity of pain and evaluate response  - Implement non-pharmacological measures as appropriate and evaluate response  - Consider cultural and social influences on pain and pain management  - Manage/alleviate anxiety  - Utilize distraction and/or relaxation techniques  - Monitor for opioid side effects  - Notify MD/LIP if interventions unsuccessful or patient reports new pain  - Anticipate increased pain with activity and pre-medicate as appropriate  Outcome: Progressing     Problem: ANXIETY  Goal: Will report anxiety at manageable levels  Description: INTERVENTIONS:  - Administer medication as ordered  - Teach and rehearse alternative coping skills  - Provide emotional support with 1:1 interaction with staff  Outcome: Progressing     Problem: Patient/Family Goals  Goal: Patient/Family Long Term Goal  Description: Patient's Long Term Goal: have a safe uncomplicated delivery    Interventions:    - See additional Care Plan goals for specific interventions  Outcome: Progressing  Goal: Patient/Family Short Term Goal  Description: Patient's Short Term Goal: adequate pain control    Interventions:   - See additional Care Plan goals for specific interventions  Outcome: Progressing     Problem: SAFETY ADULT - FALL  Goal: Free from fall injury  Description: INTERVENTIONS:  -  Assess pt frequently for physical needs  - Identify cognitive and physical deficits and behaviors that affect risk of falls.  - [facility] fall precautions as indicated by assessment.  - Educate pt/family on patient safety including physical limitations  - Instruct pt to call for assistance with activity based on assessment  - Modify environment to reduce risk of injury  - Provide assistive devices as appropriate  - Consider OT/PT consult to assist with strengthening/mobility  - Encourage toileting schedule  Outcome: Progressing

## 2024-12-20 NOTE — ANESTHESIA PROCEDURE NOTES
Labor Analgesia    Date/Time: 12/19/2024 5:50 PM    Performed by: Hola Patel MD  Authorized by: Hola Patel MD      General Information and Staff    Start Time:  12/19/2024 5:50 PM  End Time:  12/19/2024 5:59 PM  Anesthesiologist:  Hola Patel MD  Performed by:  Anesthesiologist  Patient Location:  OB  Site Identification: surface landmarks    Reason for Block: labor epidural    Preanesthetic Checklist: patient identified, IV checked, risks and benefits discussed, monitors and equipment checked, pre-op evaluation, timeout performed, IV bolus, anesthesia consent and sterile technique used      Procedure Details    Patient Position:  Sitting  Prep: ChloraPrep    Monitoring:  Heart rate and continuous pulse ox  Approach:  Midline    Epidural Needle    Injection Technique:  ERLINDA saline  Needle Type:  Tuohy  Needle Gauge:  17 G  Needle Length:  3.375 in  Needle Insertion Depth:  6  Location:  L3-4    Spinal Needle      Catheter    Catheter Type:  End hole  Catheter Size:  19 G  Catheter at Skin Depth:  12  Test Dose:  Negative    Assessment      Additional Comments     No heme or paresthesias noted throughout.  Patient tolerated procedure well without any apparent complications.

## 2024-12-20 NOTE — DISCHARGE INSTRUCTIONS
Nothing in vagina for 6 weeks.     AVOID CONSTIPATION:   -Take Miralax one capful in water or juice each morning.  You can also take each evening iif needed.  -Take Fiber supplement along with Miralax as well.  -May also take milk of magnesia or Dulcolax over the counter if needing to have a BM more urgently than Miralax is providing    -Do NOT strain for bowel movements    No strenuous activity/exercise  No tub baths.  No lifting over 10 pounds (1 gallon of mild is 8 pounds) for 6 weeks   May shower immediately    If you have a Prevena negative wound pressure dressing: the battery pack is designed to stop working after 7-8 days or so. When the batter stops working, please gently peel off the dressing & discard.     Keep wound(s) clean and dry. Wash daily with warm water & soap. Do not scrub. Gently pat dry. May cover with clean gauze or pad if needed.   Can wash with Hibiclens over the abdomen/torso to help decrease bacterial colonization if you prefer. It is alcohol-based so it can be somewhat drying.     You may ride in a car immediately.  You may drive after 1-2 weeks. Must not drive if taking a narcotic (e.g. Norco, Oxycodone) or gabapentin as these are sedating.   Nothing in the vagina or 6 weeks     Please call office if:  -fever 100.4 or higher    Please proceed to the Emergency Department at [facility] for any of the following:   -vaginal bleeding soaking greater than 1 pad per hour  -severe pelvic pain  -shortness of breath  -chest pain  -leg pain or swelling  -persistent or severe headache  -vision changes  -persistent or severe upper abdominal pain  -feeling depressed or extremely anxious  -thoughts of self harm or harming infant(s).

## 2024-12-20 NOTE — CM/SW NOTE
Went to meet with Mr Cortes and Mrs Che to review SDOH. Couple was surprised and stated that they feel comfortable with [facility], they do ask questions to make sure that they understand, but that they do feel comfortable and feel like the nurses in postpartum have done a good job of explaining care needs and the plan of care for Mrs Che and infant. Mr Cortes is not sure why car seat was marked as no car seat. Couple have car seat in car. Couple confirmed that infant will be added to their BCBS insurance plan and know that they need to get infant add in 30 days.  reviewed SSI card for infant information as well as birth certificate information. Mr Cortes and Mrs Che are aware that unit clerk will provide them with link to State of [place] so that they can order birth certificate for infant. PCP for infant will be Jitendra Turner. Couple have crib  and Mrs Che plans on formula feeding infant.  reviewed SDOH again and couple have no concerns.

## 2024-12-21 LAB
BASOPHILS # BLD AUTO: 0.04 X10(3) UL (ref 0–0.2)
BASOPHILS NFR BLD AUTO: 0.3 %
EOSINOPHIL # BLD AUTO: 0.05 X10(3) UL (ref 0–0.7)
EOSINOPHIL NFR BLD AUTO: 0.3 %
ERYTHROCYTE [DISTWIDTH] IN BLOOD BY AUTOMATED COUNT: 15 %
HCT VFR BLD AUTO: 31.7 %
HGB BLD-MCNC: 10.8 G/DL
IMM GRANULOCYTES # BLD AUTO: 0.08 X10(3) UL (ref 0–1)
IMM GRANULOCYTES NFR BLD: 0.5 %
LYMPHOCYTES # BLD AUTO: 2.46 X10(3) UL (ref 1–4)
LYMPHOCYTES NFR BLD AUTO: 16 %
MCH RBC QN AUTO: 31.5 PG (ref 26–34)
MCHC RBC AUTO-ENTMCNC: 34.1 G/DL (ref 31–37)
MCV RBC AUTO: 92.4 FL
MONOCYTES # BLD AUTO: 0.93 X10(3) UL (ref 0.1–1)
MONOCYTES NFR BLD AUTO: 6 %
NEUTROPHILS # BLD AUTO: 11.85 X10 (3) UL (ref 1.5–7.7)
NEUTROPHILS # BLD AUTO: 11.85 X10(3) UL (ref 1.5–7.7)
NEUTROPHILS NFR BLD AUTO: 76.9 %
PLATELET # BLD AUTO: 145 10(3)UL (ref 150–450)
RBC # BLD AUTO: 3.43 X10(6)UL
WBC # BLD AUTO: 15.4 X10(3) UL (ref 4–11)

## 2024-12-21 NOTE — PLAN OF CARE
Doing well, voiding, tolerated regular dinner, simethicon given, encouraged ambulation, plans to send angel Morrissey to nursery overnight    Problem: GASTROINTESTINAL - ADULT  Goal: Maintains or returns to baseline bowel function  Description: INTERVENTIONS:  - Assess bowel function  - Maintain adequate hydration with IV or PO as ordered and tolerated  - Evaluate effectiveness of GI medications  - Encourage mobilization and activity  - Obtain nutritional consult as needed  - Establish a toileting routine/schedule  - Consider collaborating with pharmacy to review patient's medication profile  Outcome: Progressing

## 2024-12-21 NOTE — PROGRESS NOTES
POD#1  Patient has no complaints    /76 (BP Location: Left arm)   Pulse 89   Temp 97.6 °F (36.4 °C) (Oral)   Resp 16   Ht 61\"   Wt 151 lb (68.5 kg)   LMP 03/21/2024 (Exact Date)   SpO2 97%   Breastfeeding No   BMI 28.53 kg/m²     Recent Labs   Lab 12/19/24  1517 12/21/24  0727   RBC 4.33 3.43*   HGB 13.8 10.8*   HCT 39.6 31.7*   MCV 91.5 92.4   MCH 31.9 31.5   MCHC 34.8 34.1   RDW 14.6 15.0   NEPRELIM 5.49 11.85*   WBC 7.6 15.4*   .0 145.0*       Abdomen: soft, NT/ND  Uterus: firm, below umbilicus  Incision: c/d/I    -A/P s/p LTCS  - doing ell

## 2024-12-21 NOTE — PLAN OF CARE
Problem: GASTROINTESTINAL - ADULT  Goal: Maintains or returns to baseline bowel function  Description: INTERVENTIONS:  - Assess bowel function  - Maintain adequate hydration with IV or PO as ordered and tolerated  - Evaluate effectiveness of GI medications  - Encourage mobilization and activity  - Obtain nutritional consult as needed  - Establish a toileting routine/schedule  - Consider collaborating with pharmacy to review patient's medication profile  Outcome: Progressing

## 2024-12-22 RX ORDER — GABAPENTIN 300 MG/1
300 CAPSULE ORAL EVERY 8 HOURS PRN
Qty: 40 CAPSULE | Refills: 0 | Status: SHIPPED | OUTPATIENT
Start: 2024-12-22

## 2024-12-22 RX ORDER — IBUPROFEN 600 MG/1
600 TABLET, FILM COATED ORAL EVERY 6 HOURS
Qty: 40 TABLET | Refills: 0 | Status: SHIPPED | OUTPATIENT
Start: 2024-12-22

## 2024-12-22 NOTE — PLAN OF CARE
Problem: GASTROINTESTINAL - ADULT  Goal: Maintains or returns to baseline bowel function  Description: INTERVENTIONS:  - Assess bowel function  - Maintain adequate hydration with IV or PO as ordered and tolerated  - Evaluate effectiveness of GI medications  - Encourage mobilization and activity  - Obtain nutritional consult as needed  - Establish a toileting routine/schedule  - Consider collaborating with pharmacy to review patient's medication profile  Outcome: Progressing     Problem: POSTPARTUM  Goal: Long Term Goal:Experiences normal postpartum course  Description: INTERVENTIONS:  - Assess and monitor vital signs and lab values.  - Assess fundus and lochia.  - Provide ice/sitz baths for perineum discomfort.  - Monitor healing of incision/episiotomy/laceration, and assess for signs and symptoms of infection and hematoma.  - Assess bladder function and monitor for bladder distention.  - Provide/instruct/assist with pericare as needed.  - Provide VTE prophylaxis as needed.  - Monitor bowel function.  - Encourage ambulation and provide assistance as needed.  - Assess and monitor emotional status and provide social service/psych resources as needed.  - Utilize standard precautions and use personal protective equipment as indicated. Ensure aseptic care of all intravenous lines and invasive tubes/drains.  - Obtain immunization and exposure to communicable diseases history.  Outcome: Progressing  Goal: Experiences normal breast weaning course  Description: INTERVENTIONS:  - Assess for and manage engorgement.  - Instruct on breast care.  - Provide comfort measures.  Outcome: Progressing  Goal: Appropriate maternal -  bonding  Description: INTERVENTIONS:  - Assess caregiver- interactions.  - Assess caregiver's emotional status and coping mechanisms.  - Encourage caregiver to participate in  daily care.  - Assess support systems available to mother/family.  - Provide /case management  support as needed.  Outcome: Progressing

## 2024-12-22 NOTE — PROGRESS NOTES
POD#2  Patient has no complaints, lochia minimal, she ambulates and tolerates diet    /79 (BP Location: Left arm)   Pulse 89   Temp 98.2 °F (36.8 °C) (Oral)   Resp 16   Ht 61\"   Wt 151 lb (68.5 kg)   LMP 03/21/2024 (Exact Date)   SpO2 97%   Breastfeeding No   BMI 28.53 kg/m²     Recent Labs   Lab 12/19/24  1517 12/21/24  0727   RBC 4.33 3.43*   HGB 13.8 10.8*   HCT 39.6 31.7*   MCV 91.5 92.4   MCH 31.9 31.5   MCHC 34.8 34.1   RDW 14.6 15.0   NEPRELIM 5.49 11.85*   WBC 7.6 15.4*   .0 145.0*       Abdomen: soft, NT/ND  Uterus: firm, below umbilicus  Incision: c/d/I    A/P s/p LTCS  - doing well  - home tomorrow

## 2024-12-22 NOTE — DISCHARGE SUMMARY
Admittion Date:  Discharge Date:12/22/24  Diagnosis: IUP 39w1d, failure to progress , GDM, chorio  Procedure:LTCS  Surgeon:  Hospital stay: Patient progressed to complete cervical dilation,developed fever, fetal tachycardia , lack of descent. Patient delivered by C/S, recovered without complications, home in stable condition on day 3. Rx for gabapentin and ibuprofen 600 given, f/u 6 weeks

## 2024-12-23 VITALS
HEIGHT: 61 IN | TEMPERATURE: 98 F | DIASTOLIC BLOOD PRESSURE: 81 MMHG | OXYGEN SATURATION: 97 % | SYSTOLIC BLOOD PRESSURE: 127 MMHG | BODY MASS INDEX: 28.51 KG/M2 | HEART RATE: 85 BPM | WEIGHT: 151 LBS | RESPIRATION RATE: 16 BRPM

## 2024-12-23 NOTE — PLAN OF CARE
Problem: GASTROINTESTINAL - ADULT  Goal: Maintains or returns to baseline bowel function  Description: INTERVENTIONS:  - Assess bowel function  - Maintain adequate hydration with IV or PO as ordered and tolerated  - Evaluate effectiveness of GI medications  - Encourage mobilization and activity  - Obtain nutritional consult as needed  - Establish a toileting routine/schedule  - Consider collaborating with pharmacy to review patient's medication profile  Outcome: Completed     Problem: POSTPARTUM  Goal: Long Term Goal:Experiences normal postpartum course  Description: INTERVENTIONS:  - Assess and monitor vital signs and lab values.  - Assess fundus and lochia.  - Provide ice/sitz baths for perineum discomfort.  - Monitor healing of incision/episiotomy/laceration, and assess for signs and symptoms of infection and hematoma.  - Assess bladder function and monitor for bladder distention.  - Provide/instruct/assist with pericare as needed.  - Provide VTE prophylaxis as needed.  - Monitor bowel function.  - Encourage ambulation and provide assistance as needed.  - Assess and monitor emotional status and provide social service/psych resources as needed.  - Utilize standard precautions and use personal protective equipment as indicated. Ensure aseptic care of all intravenous lines and invasive tubes/drains.  - Obtain immunization and exposure to communicable diseases history.  Outcome: Completed  Goal: Experiences normal breast weaning course  Description: INTERVENTIONS:  - Assess for and manage engorgement.  - Instruct on breast care.  - Provide comfort measures.  Outcome: Completed  Goal: Appropriate maternal -  bonding  Description: INTERVENTIONS:  - Assess caregiver- interactions.  - Assess caregiver's emotional status and coping mechanisms.  - Encourage caregiver to participate in  daily care.  - Assess support systems available to mother/family.  - Provide /case management support  as needed.  Outcome: Completed

## 2024-12-23 NOTE — PROGRESS NOTES
Discharge instructions reviewed with, and copy given to, patient.  Patient verbalized understanding of all instructions and denied further questions.  Pt discharged in stable condition, with infant and fob.

## 2024-12-26 ENCOUNTER — TELEPHONE (OUTPATIENT)
Dept: OBGYN UNIT | Facility: HOSPITAL | Age: 29
End: 2024-12-26

## 2024-12-26 NOTE — PROGRESS NOTES
Reviewed self and infant care w / mom, she verbalizes understanding of instructions reviewed. Encourage to follow up w/ MDs as directed and w/ questions/concerns. Hernan well, ped appt tomorrow, questions re nb rash, proper clothing and temperature also reviewed. E=5, denies ppd or bb but care reviewed and enc to join ct, flyers sent on my chart

## 2024-12-27 NOTE — PAYOR COMM NOTE
--------------  DISCHARGE REVIEW    Payor: [facility] OUT OF STATE PPO  Subscriber #:  [id number]  Authorization Number: N/A    Admit date: 12/19/24  Admit time:   2:06 PM  Discharge Date: 12/23/2024 10:40 AM     Admitting Physician: Iraida Irvin MD  Attending Physician:  No att. providers found  Primary Care Physician: Pcp, None          Discharge Summary Notes        Discharge Summary signed by Karen Maddox DO at 12/22/2024  1:14 PM       Author: Karen Maddox DO Specialty: OBSTETRICS & GYNECOLOGY Author Type: Physician    Filed: 12/22/2024  1:14 PM Date of Service: 12/22/2024  1:10 PM Status: Signed    : Karen Maddox DO (Physician)         Admittion Date:  Discharge Date:12/22/24  Diagnosis: IUP 39w1d, failure to progress , GDM, chorio  Procedure:LTCS  Surgeon:  Hospital stay: Patient progressed to complete cervical dilation,developed fever, fetal tachycardia , lack of descent. Patient delivered by C/S, recovered without complications, home in stable condition on day 3. Rx for gabapentin and ibuprofen 600 given, f/u 6 weeks    Electronically signed by Karen Maddox DO on 12/22/2024  1:14 PM         REVIEWER COMMENTS

## 2024-12-30 ENCOUNTER — TELEPHONE (OUTPATIENT)
Facility: CLINIC | Age: 29
End: 2024-12-30

## 2024-12-30 NOTE — TELEPHONE ENCOUNTER
12/20/24 had c/s having pain 3-4 out of 10 on pain scale.     Had bowel movement, denies constipation. Eating normal food, tolerated well.  Taking Gabapentin and Motrin. Per pt incision looks intact. Denies redness, swelling, incision opening.     Can try OTC pain medication, if pain not relieved to call office back. Patient verbalized understanding, agreed to and intend to comply with plan of care.

## 2025-02-05 PROBLEM — O24.410 DIET CONTROLLED GESTATIONAL DIABETES MELLITUS (GDM) IN THIRD TRIMESTER (HCC): Status: RESOLVED | Noted: 2024-12-19 | Resolved: 2025-02-05

## 2025-02-05 PROBLEM — O09.03 CLOMID PREGNANCY IN THIRD TRIMESTER (HCC): Status: RESOLVED | Noted: 2024-06-19 | Resolved: 2025-02-05

## 2025-02-05 PROBLEM — O42.90 AMNIOTIC FLUID LEAKING (HCC): Status: RESOLVED | Noted: 2024-12-19 | Resolved: 2025-02-05

## 2025-02-05 PROBLEM — O99.820 GBS (GROUP B STREPTOCOCCUS CARRIER), +RV CULTURE, CURRENTLY PREGNANT (HCC): Status: RESOLVED | Noted: 2024-12-19 | Resolved: 2025-02-05

## 2025-02-05 PROBLEM — O41.1230 CHORIOAMNIONITIS IN THIRD TRIMESTER (HCC): Status: RESOLVED | Noted: 2024-12-20 | Resolved: 2025-02-05

## 2025-02-05 PROBLEM — O36.8390 FETAL TACHYCARDIA AFFECTING MANAGEMENT OF MOTHER (HCC): Status: RESOLVED | Noted: 2024-12-20 | Resolved: 2025-02-05

## 2025-02-05 PROBLEM — Z3A.39 39 WEEKS GESTATION OF PREGNANCY (HCC): Status: RESOLVED | Noted: 2024-12-19 | Resolved: 2025-02-05

## 2025-02-05 NOTE — PROGRESS NOTES
[facility]  Obstetrics and Gynecology   Postpartum Progress Note    CC:   Chief Complaint   Patient presents with    Postpartum Care     Delivered on 24, male, , slight spotting     Contraception     Discuss options         Subjective:     Ciara Che is a 29 year old  female - postpartum visit.   Patient's last menstrual period was 2024 (exact date).   Chaperone declines  Partner here     Mandarin  Angel Ortiz [id number]    2024 - PLTCS at 39w1d -male \"Ghanshyam\" - Clomid pregnancy. Irregular menses. Diet controlled gestational diabetes (suboptimally controlled, A1c 5.7% on admission to L&D for delivery.) GBS carrier. Presented with rupture of membranes and non painful contractions. Augmented with IV oxytocin. Fetal tachycardia with intermittent variable and late decelerations. Developed fever consistent with intra-amniotic infection/chorioamnionitis. Suspected cephalopelvic disproportion. Non reassuring fetal status remote from delivery (rim/100/-1 skull with caput to +1 for over 2 hours) ->  section. Fetus direct occiput posterior. Fetal head easily disengaged and elevated out of hysterotomy. Placenta: Acute chorioamnionitis noted as was clinically suspected. Focal placental villous infarctions (involving <1% of total placental volume). Focal intervillous thrombi.      She is doing well.   Baby boy \"Ghanshyam\" - some digestive issues/gas - he is growing ok.  Formula feeding - has tried a few different ones.      Did not try to breastfeed  Breasts - ok   Lochia -  Lasted for about 1 month. Then stopped. But two days ago had something that looked like a period - only for a few hours & then it stopped. Was red blood. Not painful. Now just a discharge that is light yellow color. Normal odor.     Period since delivery? Maybe? Had the one day of the heavier flow two days ago on 25.     Periods prior to pregnancy:   -irregular. Has had some prolonged bleeding in  the past. About once a month on average but some spotting after that since around age 19. Not like that every year. Maybe stress was part of it? The first 4 days of her periods would be heavy bleeding & cramping.     Prior to pregnancy would have some postcoital spotting - reports a previous GYN knew about this & gave her Clomid. After she took the Clomid, the postcoital spotting stopped.     Past contraception - didn't want to take hormonal contraception   Plan for additional children? Probably not   Birth control plan - thinking about IUD     Pain - no pelvic pain. Sometimes mild discomfort in hips & low back   BM N  Urination N  Leaking urine? N  Mood - sleep is not great. Her mom &  are helping. Doing ok emotionally. Not overwhelmed   [place] - N  Skin is broken out with acne - chin & neck   Dermatologist prescribed spironolactone & minocycline. Just saw the derm     Patient Care Team:  Pcp, None as PCP - General  Teller, Clementine, RN, SHERLY as Registered Nurse     Review of Systems    GYN Hx  Took clomid to conceive 24 baby.   HPV vaccine N    Abnormal pap? N  Pap neg 24     OB History    Para Term  AB Living   2 1 1   1 1   SAB IAB Ectopic Multiple Live Births   1     0 1      # Outcome Date GA Lbr Gilson/2nd Weight Sex Type Anes PTL Lv   2 Term 24 39w1d 07:30 / 01:47 7 lb 15.7 oz (3.62 kg) M Caesarean EPI N LESLEY      Complications: Late decelerations, Other - see comments, Group B beta strep +, Fetal tachycardia, Intraamniotic Infection   1 SAB 23 6w4d    SAB   FD      Birth Comments: 6w4d IUFD by CRL. HCG 16,286 -> falling with cramping & bleeding.      Obstetric Comments   2024 - male \"Ghanshyam\" - Clomid pregnancy. Irregular menses. Diet controlled gestational diabetes (suboptimally controlled, A1c 5.7% on admission to L&D for delivery.) GBS carrier. Presented with rupture of membranes and non painful contractions. Augmented with IV oxytocin. Fetal  tachycardia with intermittent variable and late decelerations. Developed fever consistent with intra-amniotic infection/chorioamnionitis. Suspected cephalopelvic disproportion. Non reassuring fetal status remote from delivery (rim/100/-1 skull with caput to +1 for over 2 hours) ->  section. Fetus direct occiput posterior. Fetal head easily disengaged and elevated out of hysterotomy. Placenta: Acute chorioamnionitis noted as was clinically suspected. Focal placental villous infarctions (involving <1% of total placental volume). Focal intervillous thrombi.      Consider aspirin in future pregnancies.      Past Medical History:   Diagnosis Date    Acne vulgaris     focused mainly on chin & upper neck. Has seen derm    Clomid pregnancy ([facility]) 2024    Pregnancy conceived via Clomid    Gestational diabetes ([facility]) 2024    diet controlled. HbA1c 5.7% on admission to L&D at 39 wk    Irregular menses 2023    Since prior to 3/2023    Language barrier     Primary language Mandarin. Needs  for more technical terms    Postcoital bleeding     Prior to [id number] pregnancy would have some postcoital spotting - reports a previous GYN knew about this & gave her Clomid. After she took the Clomid, the postcoital spotting stopped.    Screening for genetic disease carrier status 2024    [employer] Carrier Screen = NEGATIVE FOR 14 OUT OF 14 DISEASES      Past Surgical History:   Procedure Laterality Date      2024    PLTCS at 39w1d - Non reassuring fetal status remote from delivery (rim/100/-1 skull with caput to +1 for over 2 hours). Suspected cephalopelvic disproportion.Fetus occiput posterior. Fetal head easily disengaged and elevated out of hysterotomy. Dr. Iraida Irvin, [facility]       Allergies:  Allergies[1]    Medications:  Current Outpatient Medications   Medication Sig Dispense Refill    amoxicillin clavulanate 875-125 MG Oral Tab Take 1 tablet by mouth 2 (two) times daily  for 10 days. 20 tablet 0    prenatal vitamin with DHA 27-0.8-228 MG Oral Cap Take 1 capsule by mouth daily.          Social History     Socioeconomic History    Marital status:    Tobacco Use    Smoking status: Never    Smokeless tobacco: Never   Vaping Use    Vaping status: Never Used   Substance and Sexual Activity    Alcohol use: Not Currently    Drug use: Never    Sexual activity: Yes     Partners: Male   Other Topics Concern    Caffeine Concern No    Exercise No    Seat Belt No    Special Diet No    Stress Concern No    Weight Concern No     Social Drivers of Health     Food Insecurity: No Food Insecurity (12/19/2024)    Food Insecurity     Food Insecurity: Never true   Transportation Needs: No Transportation Needs (12/20/2024)    Transportation Needs     Lack of Transportation: No     Car Seat: Yes   Recent Concern: Transportation Needs - Unmet Transportation Needs (12/19/2024)    Transportation Needs     Lack of Transportation: No     Car Seat: No   Stress: No Stress Concern Present (12/19/2024)    Stress     Feeling of Stress : No   Housing Stability: Low Risk  (12/19/2024)    Housing Stability     Housing Instability: No     Crib or Bassinette: Yes        Family History   Problem Relation Age of Onset    No Known Problems Son     Diabetes Neg     Thyroid disease Neg     Birth Defects Neg     Genetic Disease Neg        Depression Scale Total: 1 (2/6/2025 11:09 AM)      Depression Screening (PHQ-2/PHQ-9): Over the LAST 2 WEEKS                        Objective:     Vitals:    02/06/25 1106   BP: 110/60   Pulse: 72   Weight: 128 lb (58.1 kg)   Height: 61\"         Body mass index is 24.19 kg/m².  Physical Exam  Vitals and nursing note reviewed.   Constitutional:       Appearance: Normal appearance.   HENT:      Head: Normocephalic and atraumatic.   Eyes:      Extraocular Movements: Extraocular movements intact.      Conjunctiva/sclera: Conjunctivae normal.   Neck:      Comments: No  thyromegaly  Cardiovascular:      Rate and Rhythm: Normal rate and regular rhythm.      Heart sounds: No murmur heard.  Pulmonary:      Effort: Pulmonary effort is normal.      Breath sounds: Normal breath sounds.   Abdominal:      General: There is no distension.      Palpations: Abdomen is soft. There is no mass.      Tenderness: There is no abdominal tenderness. There is no guarding or rebound.      Hernia: No hernia is present.      Comments: Pfannenstiel skin incision clean, dry, intact. No erythema   Genitourinary:     Comments: VULVA: normal appearing vulva with no masses, tenderness or lesions  URETHRA: unremarkable   PERINEUM: intact  VAGINA: small light yellow mucus, no malodor   CERVIX: normal appearing cervix without discharge or lesions  UTERUS: uterus is normal size, shape, consistency and nontender  ADNEXA: normal adnexa in size, nontender and no masses  PELVIC FLOOR: mild to mod hypertonicity, no tenderness       Musculoskeletal:      Right lower leg: No edema.      Left lower leg: No edema.   Neurological:      General: No focal deficit present.      Mental Status: She is alert.   Psychiatric:         Mood and Affect: Mood normal.         Behavior: Behavior normal.         Thought Content: Thought content normal.         Judgment: Judgment normal.           Labs:         Assessment:     Ciara Che is a 29 year old  female - postpartum visit     Diagnoses and all orders for this visit:    Abnormal uterine bleeding, postpartum ([facility])  -     amoxicillin clavulanate 875-125 MG Oral Tab; Take 1 tablet by mouth 2 (two) times daily for 10 days.    Encounter for postpartum visit ([facility])  -     Urine Preg Test [77924]    Status post primary low transverse  section    Gestational diabetes mellitus (GDM) in puerperium ([facility])    Irregular menses    General counseling and advice for contraceptive management    HPV vaccine counseling    Screening, anemia, deficiency, iron  -     CBC With Differential  With Platelet; Future    Screening for diabetes mellitus  -     Glucose Tolerance, 75 gm (0 hr, 1 hr, 2hr), Gestational (ADA); Future  -     Comp Metabolic Panel (14); Future  -     Hemoglobin A1C; Future    Screening for thyroid disorder  -     TSH W Reflex To Free T4; Future    Screening for lipid disorders  -     Lipid Panel; Future    Language barrier         Plan:     AUB, postpartum  -reports 1 day of brighter red bleeding on 2/4/25 - not clear if this was 1st period or not  -urine preg test NEG 2/6/2025   -no malodor on exam & uterus non tender. No CMT  -not breastfeeding, so could be menses have now resumed. H/o irregular menses with odd spotting prior to pregnancy  -discussed could take a course of antibiotics in case this is chronic endometritis or could observe her bleeding pattern over the next handful of weeks  -if the bleeding is off & on every few days, would advise taking the antibiotic & would consider pelvic US  -patient plans to observe but Rx Augmentin x 10 days given in case.     S/p PLTCS   -Recovering well from delivery.   -not a great candidate for TOLAC in future     GDM diet   -suboptimally controlled. A1c 5.7% on admission to L&D  -needs screening for DM2   -reviewed 2 hr GTT, A1c at 12+ wk postpartum. Ordered along with screening labs  -encouraged to establish care with a PCP    Abnormal placenta  -Path Focal placental villous infarctions (involving <1% of total placental volume). Focal intervillous thrombi.  -Consider aspirin in future pregnancies.     Irregular menses, took Clomid to conceive this pregnancy  -5/20/24 TSH 0.859 - 1st trimester   -progestin containing method of contraception encouraged - leaning toward Mirena IUD    Pap neg 5/20/24  -Is up to date by current ASCCP guidelines.     HPV vaccine discussed & info given   Contraception - considering LNG IUD. Leaning toward Mirena IUD  Continued folic acid encouraged   Carrier screen neg     RTC in about 1-2 months for Mirena  IUD insertion. Well woman exam due after 5/20/25.     Iraida Irvin MD  [facility] - OBGYN              [1] No Known Allergies

## 2025-02-05 NOTE — PATIENT INSTRUCTIONS
Screening labs and 2 hr glucose test - aim for about 12 wk from delivery.     Consider aspirin in future pregnancies to help protect placenta    Pap smear negative 5/20/24. Well woman exam due after 5/20/25         Please establish care with a primary care physician if you do not already have one.     To establish care with a primary care physician or specialist, please call the [facility] Physician Referral line at [phone] or visit [email or web address]      Intrauterine device (IUD) basics:    There are 5 IUDs on the market currently. Four of these are hormonal & one is non-hormonal.     Hormonal IUDs:  There are 4 IUDs on the market that contain the progestin (progesterone like substance) levonorgestrel. They are:  Mirena (8 years for contraception, 5 years for heavy bleeding) or Liletta (6 years)   -52 mcg levonorgestrel  -both of these have the same dosing, but a different . Liletta's maker is studying it continuously to keep trying to get it approved for longer & longer use. We don't usually stock Liletta in our office, but if that is the one you want, we can send a prescription to your pharmacy for one & you can bring it into the office to have us insert it. About 50% of women at 1 year will have no more periods.   -[email or web address]  -[email or web address]    Kyleena (5 years)  -19.5 mg levonorgestrel  -medium dose, slightly smaller frame than above. More likely to have periods  -[email or web address]    Comfort (3 years)  -13.5 mg levonorgestrel  -lowest dose, smallest frame. Most likely to have periods on this.   -[email or web address]    Non-hormonal IUD:   Paragard (10 years)  -copper only  -may cause heavier or more painful cramps      Considerations:   -Usually women with more significant period cramping & bleeding will do better with a higher dose of progestin.   -Women with endometriosis often have progestin resistance, meaning a  higher than expected dose of progestin is needed to suppress the symptoms.     IUD insertion tips:  The best time to have an IUD inserted is when you are on your period (Cycle day 1-5 is best. Cycle day 1 is the first day of bleeding (not spotting)) for a few reasons:  (1) You should not be pregnant while you are menstruating. A urine pregnancy test should always be performed prior to IUD insertion because bleeding can occur during early pregnancy.   (2) You are shedding last month's lining and a new lining is just starting to form, so it can be exposed to the progestin right away to keep it thin and control the synchronization of its growth  (3) While you are menstruating, the cervix is naturally slightly more dilated, which can make the IUD insertion easier.     You can take ibuprofen 600 mg (3 of the over the counter 200 mg tablets) about 2 hours prior to your IUD insertion to help ease cramping of the insertion.    For more information regarding contraception & women's health:  [email or web address]/womens-health/healthy-[email or web address]/birth-control  https://[email or web address].org/learn/birth-control  https://www.bedsider.org/        Patient Information about GARDASIL®9 (pronounced “[id number]-Fo-rxfw n?n”)  (Human Papillomavirus 9-valent Vaccine, Recombinant)    IT IS RECOMMENDED THAT YOU CHECK WITH YOUR INSURANCE ABOUT COVERAGE FOR THIS VACCINE PRIOR TO ITS ADMINISTRATION    Read this information with care before getting GARDASIL®9. You or your child (the person getting  GARDASIL 9) will need 2 or 3 doses of the vaccine, depending on how old you are. It is important to read  this information before getting each dose. This information does not take the place of talking with your  health care professional about GARDASIL 9.    What is GARDASIL 9?    GARDASIL 9 is a vaccine (injection/shot) given to individuals 9 through 45 years of age to help protect  against diseases caused by some types of Human Papillomavirus  (HPV).    What diseases can GARDASIL 9 help protect against?    In girls and women 9 through 45 years of age, GARDASIL 9 helps protect against:  ? Cervical cancer  ? Vulvar and vaginal cancers  ? Anal cancer  ? Certain head and neck cancers, such as throat and back of mouth cancers  ? Precancerous cervical, vulvar, vaginal and anal lesions  ? Genital warts  In boys and men 9 through 45 years of age, GARDASIL 9 helps protect against:  ? Anal cancer  ? Certain head and neck cancers, such as throat and back of mouth cancers  ? Precancerous anal lesions  ? Genital warts    These diseases may have many causes, including HPV Types 6, 11, 16, 18, 31, 33, 45, 52, and 58.  GARDASIL 9 only protects against diseases caused by these nine types of HPV.  People cannot get HPV or any of these diseases from GARDASIL 9.    What important information about GARDASIL 9 should I know?    GARDASIL 9:  ? Does not remove the need for screening for cervical, vulvar, vaginal, anal, and certain head and  neck cancers, such as throat and back of mouth cancers as recommended by a health care  professional; women should still get routine cervical cancer screening.  ? Does not protect the person getting GARDASIL 9 from a disease that is caused by other types of  HPV, other viruses or bacteria.  ? Does not treat HPV infection.  ? Does not protect the person getting GARDASIL 9 from HPV types that he/she may already have.  GARDASIL 9 may not fully protect each person who gets it.    Who should not get GARDASIL 9?    Anyone with an allergic reaction to:  ? A previous dose of GARDASIL 9  ? A previous dose of GARDASIL®  ? Yeast (severe allergic reaction)  ? Amorphous aluminum hydroxyphosphate sulfate  ? Polysorbate 80    What should I tell the health care professional before getting GARDASIL 9?    Tell the health care professional if you or your child (the person getting GARDASIL 9):  ? Are pregnant or planning to get pregnant.  ? Have immune problems,  like HIV or cancer.  ? Take medicines that affect the immune system.  ? Have a fever over 100°F (37.8°C).  ? Might have had an allergic reaction to a previous dose of GARDASIL 9 or GARDASIL.  ? Take any medicines, even those you can buy over the counter.  The health care professional will help decide if you or your child should get the vaccine.    How is GARDASIL 9 given?    GARDASIL 9 is a shot that is usually given in the arm muscle. GARDASIL 9 may be given as 2 or 3  shots.  For persons who are   9 through 14 years old   2-shots* Dose 1: first shot  Dose 2: second shot given between 6 and 12 months  after the first shot    or 3-shots** Dose 1: first shot  Dose 2: second shot given 2 months after the first shot  Dose 3: third shot given 6 months after the first shot    15 through 45 years old 3-shots   Dose 1: first shot  Dose 2: second shot given 2 months after the first shot  Dose 3: third shot given 6 months after the first shot    *If the second shot is given earlier than 5 months after the first shot, you will need to get a third shot at  least 4 months after the second shot was given.    **The need to use a 3-dose schedule instead of a 2-dose schedule will be determined by your health care  Professional.    Make sure that you or your child gets all doses recommended by your health care professional so that  you or your child gets the best protection. If the person getting GARDASIL 9 misses a dose, tell the health  care professional and they will decide when to give the missed dose. It is important that you follow the  instructions of your health care professional regarding return visits for the follow-up doses.  Fainting can happen after getting an HPV vaccine. Sometimes people who faint can fall and hurt  themselves. For this reason, the health care professional may ask the person getting GARDASIL 9 to sit  or lie down for 15 minutes after getting the vaccine. Some people who faint might shake or become  stiff.  The health care professional may need to treat the person getting GARDASIL 9.    Can I get GARDASIL 9 if I have already gotten GARDASIL?    If you have already gotten GARDASIL, talk to your health care professional to see if GARDASIL 9 is right  for you.    Can I get GARDASIL 9 with other vaccines?    GARDASIL 9 can be given at the same time as:  ? Menactra [Meningococcal (Groups A, C, Y and W-135) Polysaccharide Diphtheria Toxoid  Conjugate Vaccine]  ? Adacel [Tetanus Toxoid, Reduced Diphtheria Toxoid and Acellular Pertussis Vaccine Adsorbed  (Tdap)]    What are the possible side effects of GARDASIL 9?    The most common side effects seen with GARDASIL 9 are:  ? pain, swelling, redness, itching, bruising, bleeding, and a lump where you got the shot  ? headache  ? fever  ? nausea  ? dizziness  ? tiredness  ? diarrhea  ? abdominal pain  ? sore throat    Studies show that there was more swelling where the shot was given when GARDASIL 9 was given at  the same time as Menactra and/or Adacel.    Tell the health care professional if you have any of these problems because these may be signs of an  allergic reaction:  ? difficulty breathing  ? wheezing (bronchospasm)  ? hives  ? rash    Additional side effects that have been reported during general use for GARDASIL 9 are shown below.  Side effects reported during the general use of GARDASIL are also shown below. GARDASIL side effects  are reported as they may be relevant to GARDASIL 9 since the vaccines are similar in composition.  GARDASIL 9  ? vomiting  ? hives    Additionally, these side effects have been seen with the general use of GARDASIL.  ? swollen glands (neck, armpit, or groin)  ? joint pain  ? unusual tiredness, weakness, or confusion  ? chills  ? generally feeling unwell  ? leg pain  ? shortness of breath  ? chest pain  ? aching muscles  ? muscle weakness  ? seizure  ? bad stomach ache  ? bleeding or bruising more easily than normal  ? skin  infection    You should contact your health care professional right away if you get any symptoms that bother you.  For a more complete list of side effects, ask the health care professional.    Call your health care professional for medical advice about side effects. You may also report any side  effects to your doctor or directly to Vaccine Adverse Event Reporting System (VAERS). The VAERS tollfr number is 1-178.643.5647 or report online to www.vaers.[facility].gov.    GARDASIL 9 was not studied in women who knew they were pregnant. A pregnancy registry is available.  You are encouraged to contact the registry as soon as you become aware of your pregnancy by calling 1- [phone], or ask your health care professional to contact the registry for you.    What is in GARDASIL 9?    GARDASIL 9 contains:  ? Proteins of HPV Types 6, 11, 16, 18, 31, 33, 45, 52, and 58  ? Amorphous aluminum hydroxyphosphate sulfate  ? Yeast protein  ? Sodium chloride  ? L-histidine  ? Polysorbate 80  ? Sodium borate  ? Water    This document is a summary of information about GARDASIL 9.    To learn more about GARDASIL 9, please talk to the health care professional or visit  [email or web address].    For patent information: [email or web address]/product/[email or web address].html  The trademarks depicted herein are owned by their respective companies.  Copyright © 9301-9313 [employer] a subsidiary of [employer] Inc.  All rights reserved.  Revised: 06/2020  [id number]

## 2025-02-06 ENCOUNTER — POSTPARTUM (OUTPATIENT)
Facility: CLINIC | Age: 30
End: 2025-02-06
Payer: COMMERCIAL

## 2025-02-06 VITALS
DIASTOLIC BLOOD PRESSURE: 60 MMHG | BODY MASS INDEX: 24.17 KG/M2 | HEART RATE: 72 BPM | WEIGHT: 128 LBS | HEIGHT: 61 IN | SYSTOLIC BLOOD PRESSURE: 110 MMHG

## 2025-02-06 DIAGNOSIS — Z30.09 GENERAL COUNSELING AND ADVICE FOR CONTRACEPTIVE MANAGEMENT: ICD-10-CM

## 2025-02-06 DIAGNOSIS — Z13.1 SCREENING FOR DIABETES MELLITUS: ICD-10-CM

## 2025-02-06 DIAGNOSIS — Z13.220 SCREENING FOR LIPID DISORDERS: ICD-10-CM

## 2025-02-06 DIAGNOSIS — Z13.0 SCREENING, ANEMIA, DEFICIENCY, IRON: ICD-10-CM

## 2025-02-06 DIAGNOSIS — Z60.3 LANGUAGE BARRIER: ICD-10-CM

## 2025-02-06 DIAGNOSIS — Z13.29 SCREENING FOR THYROID DISORDER: ICD-10-CM

## 2025-02-06 DIAGNOSIS — N92.6 IRREGULAR MENSES: ICD-10-CM

## 2025-02-06 DIAGNOSIS — Z75.8 LANGUAGE BARRIER: ICD-10-CM

## 2025-02-06 DIAGNOSIS — Z71.85 HPV VACCINE COUNSELING: ICD-10-CM

## 2025-02-06 DIAGNOSIS — Z98.891 STATUS POST PRIMARY LOW TRANSVERSE CESAREAN SECTION: ICD-10-CM

## 2025-02-06 LAB
CONTROL LINE PRESENT WITH A CLEAR BACKGROUND (YES/NO): YES YES/NO
KIT LOT #: NORMAL NUMERIC
PREGNANCY TEST, URINE: NEGATIVE

## 2025-02-06 PROCEDURE — 3008F BODY MASS INDEX DOCD: CPT | Performed by: OBSTETRICS & GYNECOLOGY

## 2025-02-06 PROCEDURE — 3074F SYST BP LT 130 MM HG: CPT | Performed by: OBSTETRICS & GYNECOLOGY

## 2025-02-06 PROCEDURE — 3078F DIAST BP <80 MM HG: CPT | Performed by: OBSTETRICS & GYNECOLOGY

## 2025-02-06 PROCEDURE — 81025 URINE PREGNANCY TEST: CPT | Performed by: OBSTETRICS & GYNECOLOGY

## 2025-02-06 SDOH — SOCIAL STABILITY - SOCIAL INSECURITY: ACCULTURATION DIFFICULTY: Z60.3

## 2025-03-20 ENCOUNTER — OFFICE VISIT (OUTPATIENT)
Facility: CLINIC | Age: 30
End: 2025-03-20
Payer: COMMERCIAL

## 2025-03-20 VITALS
WEIGHT: 129 LBS | SYSTOLIC BLOOD PRESSURE: 100 MMHG | BODY MASS INDEX: 24.35 KG/M2 | HEIGHT: 61 IN | DIASTOLIC BLOOD PRESSURE: 60 MMHG | HEART RATE: 78 BPM

## 2025-03-20 DIAGNOSIS — N92.0 INTERMENSTRUAL SPOTTING: ICD-10-CM

## 2025-03-20 DIAGNOSIS — N93.9 ABNORMAL UTERINE BLEEDING (AUB): Primary | ICD-10-CM

## 2025-03-20 PROCEDURE — 99214 OFFICE O/P EST MOD 30 MIN: CPT | Performed by: OBSTETRICS & GYNECOLOGY

## 2025-03-20 PROCEDURE — 3008F BODY MASS INDEX DOCD: CPT | Performed by: OBSTETRICS & GYNECOLOGY

## 2025-03-20 PROCEDURE — 3078F DIAST BP <80 MM HG: CPT | Performed by: OBSTETRICS & GYNECOLOGY

## 2025-03-20 PROCEDURE — 3074F SYST BP LT 130 MM HG: CPT | Performed by: OBSTETRICS & GYNECOLOGY

## 2025-03-20 PROCEDURE — 81025 URINE PREGNANCY TEST: CPT | Performed by: OBSTETRICS & GYNECOLOGY

## 2025-03-20 RX ORDER — SPIRONOLACTONE 100 MG/1
100 TABLET, FILM COATED ORAL DAILY
COMMUNITY
Start: 2025-03-01

## 2025-03-20 NOTE — PATIENT INSTRUCTIONS
Labs - please do these fasting.   Schedule pelvic ultrasound   Then may need OR if something inside uterus & can put Mirena IUD in at that time if desired.

## 2025-03-20 NOTE — PROGRESS NOTES
[facility]  Obstetrics and Gynecology   Progress Note    CC:   Chief Complaint   Patient presents with    Other     Patient scheduled for Mirena IUD insertion today but is actually having abnormal bleeding.         Subjective:     Ciara Che is a 29 year old  female - scheduled for Mirena IUD insertion today, but is actually having abnormal bleeding.   Patient's last menstrual period was 2024 (exact date).   Mandarin  [id number]   ZiLing  Chaperone declines     2024 - PLTCS at 39w1d -male \"Ghanshyam\" - Clomid pregnancy. Irregular menses. Diet controlled gestational diabetes (suboptimally controlled, A1c 5.7% on admission to L&D for delivery.) GBS carrier. Presented with rupture of membranes and non painful contractions. Augmented with IV oxytocin. Fetal tachycardia with intermittent variable and late decelerations. Developed fever consistent with intra-amniotic infection/chorioamnionitis. Suspected cephalopelvic disproportion. Non reassuring fetal status remote from delivery (rim/100/-1 skull with caput to +1 for over 2 hours) ->  section. Fetus direct occiput posterior. Fetal head easily disengaged and elevated out of hysterotomy. Placenta: Acute chorioamnionitis noted as was clinically suspected. Focal placental villous infarctions (involving <1% of total placental volume). Focal intervillous thrombi.  ~~~~~~~~~~~~~~~~~~~~~~~  25 Postpartum visit. Formula feeding. Lochia -  Lasted for about 1 month. Then stopped. But two days ago had something that looked like a period - only for a few hours & then it stopped. Was red blood. Not painful. Now just a discharge that is light yellow color. Normal odor. Period since delivery? Maybe? Had the one day of the heavier flow two days ago on 25. No pelvic pain. Skin is broken out with acne - chin & neck. Dermatologist prescribed spironolactone & minocycline. Just saw the derm     H/o irregular periods prior to pregnancy that  were usually at least 4 days of heavy flow & cramping. A/so h/o postcoital spotting prior to pregnancy   Past contraception - didn't want to take hormonal contraception   Plan for additional children? Probably not   Birth control plan - thinking about IUD    Urine preg test NEG  Rx Augmentin for possible endometritis  Discussed if abnormal bleeding does not straighten out, would advise pelvic US to look for retained placental tissue, etc   Labs ordered to check thyroid, etc    Counseled regarding IUD options. Patient leaning toward Mirena IUD but wants to think about it.   ~~~~~~~~~~~~~~~~~~~~~~  3/20/2025 Scheduled as Mirena IUD insertion but patient is actually having AUB    Augmentin taken? Y     Pelvic US done? N  AUB resolved? No  Bloodwork not done.      Periods? Odd - every 2-3 weeks will have a painless bleed lighter than her normal periods. Some days will also see red tinged discharge. Not daily. Does see some blood when she is voids, but doesn't think the blood is from the bladder.      Bleeding episodes:    2/11-2/19 bleeding (9 days) - Does not fill up a pad. Lighter than her normal periods. No cramping/pain   No bleed x 1 week.   2/27-3/5 bleeding (7 days, onset 17 days from the start of the previous bleed)  No bleeding  3/13 until currently - bleeding (onset 15 days from the start of the previous bleed)     Patient's last menstrual period was 03/13/2024 (exact date). ?     Patient Care Team:  Pcp, None as PCP - Clementine Harden, RN, [facility] as Registered Nurse     Review of Systems    GYN Hx  Periods prior to pregnancy:   -irregular. Has had some prolonged bleeding in the past. About once a month on average but some spotting after that since around age 19. Not like that every year. Maybe stress was part of it? The first 4 days of her periods would be heavy bleeding & cramping.     Prior to pregnancy would have some postcoital spotting - reports a previous GYN knew about this & gave her Clomid.  After she took the Clomid, the postcoital spotting stopped.     Took clomid to conceive 24 baby.   HPV vaccine N    Abnormal pap? N  Pap neg 24     OB History    Para Term  AB Living   2 1 1   1 1   SAB IAB Ectopic Multiple Live Births   1     0 1      # Outcome Date GA Lbr Gilson/2nd Weight Sex Type Anes PTL Lv   2 Term 24 39w1d 07:30 / 01:47 7 lb 15.7 oz (3.62 kg) M Caesarean EPI N LESLEY      Complications: Late decelerations, Other - see comments, Group B beta strep +, Fetal tachycardia, Intraamniotic Infection   1 SAB 23 6w4d    SAB   FD      Birth Comments: 6w4d IUFD by CRL. HCG 16,286 -> falling with cramping & bleeding.      Obstetric Comments   2024 - male \"Hganshyam\" - Clomid pregnancy. Irregular menses. Diet controlled gestational diabetes (suboptimally controlled, A1c 5.7% on admission to L&D for delivery.) GBS carrier. Presented with rupture of membranes and non painful contractions. Augmented with IV oxytocin. Fetal tachycardia with intermittent variable and late decelerations. Developed fever consistent with intra-amniotic infection/chorioamnionitis. Suspected cephalopelvic disproportion. Non reassuring fetal status remote from delivery (rim/100/-1 skull with caput to +1 for over 2 hours) ->  section. Fetus direct occiput posterior. Fetal head easily disengaged and elevated out of hysterotomy. Placenta: Acute chorioamnionitis noted as was clinically suspected. Focal placental villous infarctions (involving <1% of total placental volume). Focal intervillous thrombi.      Consider aspirin in future pregnancies.      Past Medical History:   Diagnosis Date    Acne vulgaris     focused mainly on chin & upper neck. Has seen derm    Clomid pregnancy (HCC) 2024    Pregnancy conceived via Clomid    Gestational diabetes (HCC) 2024    diet controlled. HbA1c 5.7% on admission to L&D at 39 wk    Irregular menses 2023    Since prior to 3/2023    Language  barrier     Primary language Mandarin. Needs  for more technical terms    Postcoital bleeding     Prior to [id number] pregnancy would have some postcoital spotting - reports a previous GYN knew about this & gave her Clomid. After she took the Clomid, the postcoital spotting stopped.    Screening for genetic disease carrier status 2024    [employer] Carrier Screen = NEGATIVE FOR 14 OUT OF 14 DISEASES      Past Surgical History:   Procedure Laterality Date      2024    PLTCS at 39w1d - Non reassuring fetal status remote from delivery (rim/100/-1 skull with caput to +1 for over 2 hours). Suspected cephalopelvic disproportion.Fetus occiput posterior. Fetal head easily disengaged and elevated out of hysterotomy. Dr. Iraida Irvin, [facility]       Allergies:  Allergies[1]    Medications:  Current Outpatient Medications   Medication Sig Dispense Refill    spironolactone 100 MG Oral Tab Take 1 tablet (100 mg total) by mouth daily.      prenatal vitamin with DHA 27-0.8-228 MG Oral Cap Take 1 capsule by mouth daily.          Social History     Socioeconomic History    Marital status:    Tobacco Use    Smoking status: Never    Smokeless tobacco: Never   Vaping Use    Vaping status: Never Used   Substance and Sexual Activity    Alcohol use: Not Currently    Drug use: Never    Sexual activity: Not Currently     Partners: Male   Other Topics Concern    Caffeine Concern No    Exercise No    Seat Belt No    Special Diet No    Stress Concern No    Weight Concern No     Social Drivers of Health     Food Insecurity: No Food Insecurity (2024)    Food Insecurity     Food Insecurity: Never true   Transportation Needs: No Transportation Needs (2024)    Transportation Needs     Lack of Transportation: No     Car Seat: Yes   Recent Concern: Transportation Needs - Unmet Transportation Needs (2024)    Transportation Needs     Lack of Transportation: No     Car Seat: No   Stress: No  Stress Concern Present (2024)    Stress     Feeling of Stress : No   Housing Stability: Low Risk  (2024)    Housing Stability     Housing Instability: No     Crib or Bassinette: Yes        Family History   Problem Relation Age of Onset    No Known Problems Son     Diabetes Neg     Thyroid disease Neg     Birth Defects Neg     Genetic Disease Neg        Depression Scale Total: 1 (2025 11:09 AM)      Depression Screening (PHQ-2/PHQ-9): Over the LAST 2 WEEKS                        Objective:     Vitals:    25 1058   BP: 100/60   Pulse: 78   Weight: 129 lb (58.5 kg)   Height: 61\"         Body mass index is 24.37 kg/m².  Physical Exam  Vitals and nursing note reviewed.   Constitutional:       Appearance: Normal appearance.   Neurological:      General: No focal deficit present.      Mental Status: She is alert.   Psychiatric:         Mood and Affect: Mood normal.         Behavior: Behavior normal.         Thought Content: Thought content normal.         Judgment: Judgment normal.           Labs:         Assessment:     Ciara Che is a 29 year old  female - AUB since her delivery 24. Having intermittent bleeding for 7-9 days at a time about 15-17 days from the onset of one episode to the next, painless & lighter than usual periods. Also with intermittent vaginal spotting. Possible anovulatory bleeding, but could also have retained placental fragments, endometrial polyp, etc. Recommend hold off on Mirena IUD insertion today & check pelvic ultrasound first.     Diagnoses and all orders for this visit:    Abnormal uterine bleeding (AUB)  -     US PELVIS W EV (CPT=76856/[id number]); Future    Intermenstrual spotting    Other orders  -     Urine Pregnancy Test         Plan:     AUB, postpartum  -reports 1 day of brighter red bleeding on 25 - not clear if this was 1st period or not  -urine preg test NEG 2025   -no malodor on exam & uterus non tender. No CMT  -not breastfeeding, so could  be menses have now resumed. H/o irregular menses with odd spotting prior to pregnancy  -took Augmentin x 10 days for possible endometritis  -3/20/25 Still having abnormal bleeding about every 15-17 days will have 7-9 days of bleeding with some days of red spotting in between. Urine preg test NEG. Recommend labs as previously ordered & obtain pelvic ultrasound due to potential for retained placental fragment, endometrial polyp, GTN. Hold off on Mirena IUD insertion. Patient in agreement.   -Reviewed if needs to go to OR for hysteroscopy, D&C, could place Mirena IUD at that time    S/p PLTCS   -Recovering well from delivery.   -not a great candidate for TOLAC in future     GDM diet   -suboptimally controlled. A1c 5.7% on admission to L&D  -needs screening for DM2   -reviewed 2 hr GTT, A1c at 12+ wk postpartum. Ordered along with screening labs at previous  -encouraged to establish care with a PCP    Abnormal placenta  -Path Focal placental villous infarctions (involving <1% of total placental volume). Focal intervillous thrombi.  -Consider aspirin in future pregnancies.     Irregular menses, took Clomid to conceive this pregnancy  -5/20/24 TSH 0.859 - 1st trimester   -progestin containing method of contraception encouraged - leaning toward Mirena IUD    Pap neg 5/20/24  -Is up to date by current ASCCP guidelines.     HPV vaccine discussed & info given   Contraception - considering LNG IUD. Leaning toward Mirena IUD  Continued folic acid encouraged   Carrier screen neg     RTC pending pelvic US & labs for Mirena IUD insertion. Well woman exam due after 5/20/25.     Iraida Irvin MD  [facility] - OBGYN            [1] No Known Allergies

## 2025-03-25 ENCOUNTER — LAB ENCOUNTER (OUTPATIENT)
Dept: LAB | Age: 30
End: 2025-03-25
Attending: SOCIAL WORKER
Payer: COMMERCIAL

## 2025-03-25 DIAGNOSIS — Z13.0 SCREENING, ANEMIA, DEFICIENCY, IRON: ICD-10-CM

## 2025-03-25 DIAGNOSIS — Z13.29 SCREENING FOR THYROID DISORDER: ICD-10-CM

## 2025-03-25 DIAGNOSIS — Z13.220 SCREENING FOR LIPID DISORDERS: ICD-10-CM

## 2025-03-25 DIAGNOSIS — Z13.1 SCREENING FOR DIABETES MELLITUS: ICD-10-CM

## 2025-03-25 LAB
ALBUMIN SERPL-MCNC: 4.9 G/DL (ref 3.2–4.8)
ALBUMIN/GLOB SERPL: 1.7 {RATIO} (ref 1–2)
ALP LIVER SERPL-CCNC: 96 U/L
ALT SERPL-CCNC: 27 U/L
ANION GAP SERPL CALC-SCNC: 8 MMOL/L (ref 0–18)
AST SERPL-CCNC: 24 U/L (ref ?–34)
BASOPHILS # BLD AUTO: 0.03 X10(3) UL (ref 0–0.2)
BASOPHILS NFR BLD AUTO: 0.4 %
BILIRUB SERPL-MCNC: 0.7 MG/DL (ref 0.3–1.2)
BUN BLD-MCNC: 11 MG/DL (ref 9–23)
CALCIUM BLD-MCNC: 10.2 MG/DL (ref 8.7–10.6)
CHLORIDE SERPL-SCNC: 103 MMOL/L (ref 98–112)
CHOLEST SERPL-MCNC: 187 MG/DL (ref ?–200)
CO2 SERPL-SCNC: 28 MMOL/L (ref 21–32)
CREAT BLD-MCNC: 0.77 MG/DL
EGFRCR SERPLBLD CKD-EPI 2021: 106 ML/MIN/1.73M2 (ref 60–?)
EOSINOPHIL # BLD AUTO: 0.13 X10(3) UL (ref 0–0.7)
EOSINOPHIL NFR BLD AUTO: 1.7 %
ERYTHROCYTE [DISTWIDTH] IN BLOOD BY AUTOMATED COUNT: 13.1 %
EST. AVERAGE GLUCOSE BLD GHB EST-MCNC: 117 MG/DL (ref 68–126)
FASTING PATIENT LIPID ANSWER: YES
FASTING STATUS PATIENT QL REPORTED: YES
GLOBULIN PLAS-MCNC: 2.9 G/DL (ref 2–3.5)
GLUCOSE 1H P GLC SERPL-MCNC: 153 MG/DL
GLUCOSE 2H P GLC SERPL-MCNC: 119 MG/DL
GLUCOSE BLD-MCNC: 95 MG/DL (ref 70–99)
GLUCOSE P FAST SERPL-MCNC: 95 MG/DL
HBA1C MFR BLD: 5.7 % (ref ?–5.7)
HCT VFR BLD AUTO: 41.3 %
HDLC SERPL-MCNC: 46 MG/DL (ref 40–59)
HGB BLD-MCNC: 13.9 G/DL
IMM GRANULOCYTES # BLD AUTO: 0.02 X10(3) UL (ref 0–1)
IMM GRANULOCYTES NFR BLD: 0.3 %
LDLC SERPL CALC-MCNC: 115 MG/DL (ref ?–100)
LYMPHOCYTES # BLD AUTO: 2.85 X10(3) UL (ref 1–4)
LYMPHOCYTES NFR BLD AUTO: 36.8 %
MCH RBC QN AUTO: 30.8 PG (ref 26–34)
MCHC RBC AUTO-ENTMCNC: 33.7 G/DL (ref 31–37)
MCV RBC AUTO: 91.6 FL
MONOCYTES # BLD AUTO: 0.37 X10(3) UL (ref 0.1–1)
MONOCYTES NFR BLD AUTO: 4.8 %
NEUTROPHILS # BLD AUTO: 4.34 X10 (3) UL (ref 1.5–7.7)
NEUTROPHILS # BLD AUTO: 4.34 X10(3) UL (ref 1.5–7.7)
NEUTROPHILS NFR BLD AUTO: 56 %
NONHDLC SERPL-MCNC: 141 MG/DL (ref ?–130)
OSMOLALITY SERPL CALC.SUM OF ELEC: 287 MOSM/KG (ref 275–295)
PLATELET # BLD AUTO: 302 10(3)UL (ref 150–450)
POTASSIUM SERPL-SCNC: 4 MMOL/L (ref 3.5–5.1)
PROT SERPL-MCNC: 7.8 G/DL (ref 5.7–8.2)
RBC # BLD AUTO: 4.51 X10(6)UL
SODIUM SERPL-SCNC: 139 MMOL/L (ref 136–145)
TRIGL SERPL-MCNC: 148 MG/DL (ref 30–149)
TSI SER-ACNC: 1.07 UIU/ML (ref 0.55–4.78)
VLDLC SERPL CALC-MCNC: 26 MG/DL (ref 0–30)
WBC # BLD AUTO: 7.7 X10(3) UL (ref 4–11)

## 2025-03-25 PROCEDURE — 83036 HEMOGLOBIN GLYCOSYLATED A1C: CPT | Performed by: OBSTETRICS & GYNECOLOGY

## 2025-03-25 PROCEDURE — 82951 GLUCOSE TOLERANCE TEST (GTT): CPT | Performed by: OBSTETRICS & GYNECOLOGY

## 2025-03-25 PROCEDURE — 80061 LIPID PANEL: CPT | Performed by: OBSTETRICS & GYNECOLOGY

## 2025-03-25 PROCEDURE — 80050 GENERAL HEALTH PANEL: CPT | Performed by: OBSTETRICS & GYNECOLOGY

## 2025-03-27 PROBLEM — R73.03 PREDIABETES: Status: ACTIVE | Noted: 2025-03-25

## 2025-03-27 PROBLEM — Z86.32 HISTORY OF DIET CONTROLLED GESTATIONAL DIABETES MELLITUS (GDM): Status: ACTIVE | Noted: 2025-02-05

## 2025-03-27 NOTE — PROGRESS NOTES
Pt aware of results & recommendations: Patient is about 3 months postpartum.    CBC, CMP, TSH = normal  LDL & non HDL minimally elevated.    A1c elevated to 5.7% - prediabetic range  2 hr glucose test with 1 elevated fasting level. This is consistent with prediabetes as well.    Prediabetes advice:  Work on diet & exercise.  Specifically for insulin resistance - cut back on simple carbs/sugars  -elevated insulin makes you hungry & makes it difficult to lose weight  Do not eat carbs alone - always try to have some fat & protein, fiber with them. Eat the carbs last in your meal. Carbs - Try to think of them as dessert  High fiber, high protein. Leafy greens help a lot.  -aim for 25-28 grams of fiber per day. Can add in psyllium husk supplement (see below)  -aim for  grams of protein per day  Avoid processed foods  Walk 10-15 min after every meal  Weight lifting 30 min three times per week  -goal is to challenge & build the muscles as these tissues are much more metabolically active than fat cells & will help with glucose regulation and metabolism  Start psyllium fiber supplement daily - helps stabilize blood sugars.  Magnesium glycinate 500 mg nightly - helps body use insulin better.  Adequate sleep very important to keep cortisol level lower. Try to get to sleep by 10 pm for best quality sleep.  Follow up with primary care physician - need to be monitored to make sure not developing true diabetes mellitus.    Verbalized understanding.

## 2025-04-10 ENCOUNTER — HOSPITAL ENCOUNTER (OUTPATIENT)
Dept: ULTRASOUND IMAGING | Age: 30
Discharge: HOME | End: 2025-04-10
Attending: OBSTETRICS & GYNECOLOGY
Payer: COMMERCIAL

## 2025-04-10 DIAGNOSIS — N93.9 ABNORMAL UTERINE BLEEDING (AUB): ICD-10-CM

## 2025-04-10 PROCEDURE — 76830 TRANSVAGINAL US NON-OB: CPT | Performed by: OBSTETRICS & GYNECOLOGY

## 2025-04-10 PROCEDURE — 76856 US EXAM PELVIC COMPLETE: CPT | Performed by: OBSTETRICS & GYNECOLOGY

## 2025-04-14 ENCOUNTER — PATIENT MESSAGE (OUTPATIENT)
Facility: CLINIC | Age: 30
End: 2025-04-14

## 2025-04-14 PROBLEM — R93.89 ABNORMAL PELVIC ULTRASOUND: Status: ACTIVE | Noted: 2025-04-10

## 2025-04-14 PROBLEM — N93.9 ABNORMAL UTERINE BLEEDING (AUB): Status: ACTIVE | Noted: 2025-04-14

## 2025-04-15 ENCOUNTER — TELEPHONE (OUTPATIENT)
Facility: CLINIC | Age: 30
End: 2025-04-15

## 2025-04-15 DIAGNOSIS — N93.9 ABNORMAL UTERINE BLEEDING (AUB): Primary | ICD-10-CM

## 2025-04-15 NOTE — TELEPHONE ENCOUNTER
----- Message from Sherly [place] sent at 4/15/2025  1:33 PM CDT -----  Patient seen mychart msg, Would like to proceed with recommended procedure ASAP ( hysteroscopy, dilation and curettage, possible polypectomy, possible Mirena IUD insertion in the OR). Routed to Dr. Irvin and Rita.     Rita will touch base with pt. Patient verbalized understanding, agreed to and intend to comply with plan of care.    ----- Message -----  From: Iraida Irvin MD  Sent: 4/14/2025   5:55 PM CDT  To: [facility] Clinical Staff    Pelvic US images & report reviewed.   Uterus anteverted, slightly heterogeneous myometrium  Focal vascular lesion noted in endometrial cavity measuring 1.7 x 0.9 x 1.8 cm  Bilateral ovaries with polycystic morphology & increased volumes. Right ovarian volume 23.2 mL. Left ovarian volume 15.2 mL.     Suspect an endometrial polyp. Could also be retained placental tissue or a placental tumor - both less likely.     Would recommend hysteroscopy, dilation and curettage, possible polypectomy, possible Mirena IUD insertion in the OR (if patient is still thinking she would like Mirena IUD inserted.)  ----- Message -----  From: Hermelinda Orellana In  Sent: 4/10/2025  12:00 PM CDT  To: Iraida Irvin MD

## 2025-04-28 ENCOUNTER — LABORATORY ENCOUNTER (OUTPATIENT)
Dept: LAB | Age: 30
End: 2025-04-28
Attending: OBSTETRICS & GYNECOLOGY
Payer: COMMERCIAL

## 2025-04-28 DIAGNOSIS — N93.9 ABNORMAL UTERINE BLEEDING: ICD-10-CM

## 2025-04-28 LAB
ERYTHROCYTE [DISTWIDTH] IN BLOOD BY AUTOMATED COUNT: 12.7 %
HCT VFR BLD AUTO: 41.3 % (ref 35–48)
HGB BLD-MCNC: 14 G/DL (ref 12–16)
MCH RBC QN AUTO: 31.4 PG (ref 26–34)
MCHC RBC AUTO-ENTMCNC: 33.9 G/DL (ref 31–37)
MCV RBC AUTO: 92.6 FL (ref 80–100)
PLATELET # BLD AUTO: 275 10(3)UL (ref 150–450)
RBC # BLD AUTO: 4.46 X10(6)UL (ref 3.8–5.3)
WBC # BLD AUTO: 8 X10(3) UL (ref 4–11)

## 2025-04-28 PROCEDURE — 85027 COMPLETE CBC AUTOMATED: CPT

## 2025-04-28 PROCEDURE — 36415 COLL VENOUS BLD VENIPUNCTURE: CPT

## 2025-05-05 ENCOUNTER — HOSPITAL ENCOUNTER (OUTPATIENT)
Facility: HOSPITAL | Age: 30
Setting detail: HOSPITAL OUTPATIENT SURGERY
Discharge: HOME | End: 2025-05-05
Attending: OBSTETRICS & GYNECOLOGY | Admitting: OBSTETRICS & GYNECOLOGY
Payer: COMMERCIAL

## 2025-05-05 ENCOUNTER — ANESTHESIA (OUTPATIENT)
Dept: SURGERY | Facility: HOSPITAL | Age: 30
End: 2025-05-05
Payer: COMMERCIAL

## 2025-05-05 ENCOUNTER — ANESTHESIA EVENT (OUTPATIENT)
Dept: SURGERY | Facility: HOSPITAL | Age: 30
End: 2025-05-05
Payer: COMMERCIAL

## 2025-05-05 VITALS
BODY MASS INDEX: 24.24 KG/M2 | TEMPERATURE: 98 F | HEART RATE: 83 BPM | SYSTOLIC BLOOD PRESSURE: 120 MMHG | OXYGEN SATURATION: 100 % | HEIGHT: 61 IN | DIASTOLIC BLOOD PRESSURE: 81 MMHG | WEIGHT: 128.38 LBS | RESPIRATION RATE: 20 BRPM

## 2025-05-05 DIAGNOSIS — N93.9 ABNORMAL UTERINE BLEEDING (AUB): ICD-10-CM

## 2025-05-05 DIAGNOSIS — N93.9 ABNORMAL UTERINE BLEEDING: Primary | ICD-10-CM

## 2025-05-05 PROBLEM — Z98.890 STATUS POST HYSTEROSCOPIC POLYPECTOMY: Status: ACTIVE | Noted: 2025-05-05

## 2025-05-05 PROBLEM — N84.0 ENDOMETRIAL POLYP: Status: ACTIVE | Noted: 2025-05-05

## 2025-05-05 PROBLEM — Z30.430 ENCOUNTER FOR IUD INSERTION: Status: ACTIVE | Noted: 2025-05-05

## 2025-05-05 LAB
B-HCG UR QL: NEGATIVE
B-HCG UR QL: NEGATIVE

## 2025-05-05 PROCEDURE — 58300 INSERT INTRAUTERINE DEVICE: CPT | Performed by: OBSTETRICS & GYNECOLOGY

## 2025-05-05 PROCEDURE — 58558 HYSTEROSCOPY BIOPSY: CPT | Performed by: OBSTETRICS & GYNECOLOGY

## 2025-05-05 DEVICE — MIRENA IUD: Type: IMPLANTABLE DEVICE | Status: FUNCTIONAL

## 2025-05-05 RX ORDER — ONDANSETRON 2 MG/ML
4 INJECTION INTRAMUSCULAR; INTRAVENOUS EVERY 6 HOURS PRN
Status: DISCONTINUED | OUTPATIENT
Start: 2025-05-05 | End: 2025-05-05

## 2025-05-05 RX ORDER — ACETAMINOPHEN 500 MG
1000 TABLET ORAL ONCE
Status: DISCONTINUED | OUTPATIENT
Start: 2025-05-05 | End: 2025-05-05 | Stop reason: HOSPADM

## 2025-05-05 RX ORDER — LIDOCAINE HYDROCHLORIDE 10 MG/ML
INJECTION, SOLUTION EPIDURAL; INFILTRATION; INTRACAUDAL; PERINEURAL AS NEEDED
Status: DISCONTINUED | OUTPATIENT
Start: 2025-05-05 | End: 2025-05-05 | Stop reason: SURG

## 2025-05-05 RX ORDER — SODIUM CHLORIDE, SODIUM LACTATE, POTASSIUM CHLORIDE, CALCIUM CHLORIDE 600; 310; 30; 20 MG/100ML; MG/100ML; MG/100ML; MG/100ML
INJECTION, SOLUTION INTRAVENOUS CONTINUOUS
Status: DISCONTINUED | OUTPATIENT
Start: 2025-05-05 | End: 2025-05-05

## 2025-05-05 RX ORDER — LIDOCAINE HYDROCHLORIDE AND EPINEPHRINE 10; 10 MG/ML; UG/ML
INJECTION, SOLUTION INFILTRATION; PERINEURAL AS NEEDED
Status: DISCONTINUED | OUTPATIENT
Start: 2025-05-05 | End: 2025-05-05 | Stop reason: HOSPADM

## 2025-05-05 RX ORDER — KETOROLAC TROMETHAMINE 30 MG/ML
INJECTION, SOLUTION INTRAMUSCULAR; INTRAVENOUS AS NEEDED
Status: DISCONTINUED | OUTPATIENT
Start: 2025-05-05 | End: 2025-05-05 | Stop reason: SURG

## 2025-05-05 RX ORDER — SCOPOLAMINE 1 MG/3D
1 PATCH, EXTENDED RELEASE TRANSDERMAL ONCE
Status: DISCONTINUED | OUTPATIENT
Start: 2025-05-05 | End: 2025-05-05 | Stop reason: HOSPADM

## 2025-05-05 RX ORDER — ACETAMINOPHEN 500 MG
1000 TABLET ORAL ONCE AS NEEDED
Status: DISCONTINUED | OUTPATIENT
Start: 2025-05-05 | End: 2025-05-05

## 2025-05-05 RX ORDER — HYDROMORPHONE HYDROCHLORIDE 1 MG/ML
0.2 INJECTION, SOLUTION INTRAMUSCULAR; INTRAVENOUS; SUBCUTANEOUS EVERY 5 MIN PRN
Status: DISCONTINUED | OUTPATIENT
Start: 2025-05-05 | End: 2025-05-05

## 2025-05-05 RX ORDER — NICOTINE POLACRILEX 4 MG
30 LOZENGE BUCCAL
Status: DISCONTINUED | OUTPATIENT
Start: 2025-05-05 | End: 2025-05-05

## 2025-05-05 RX ORDER — MIDAZOLAM HYDROCHLORIDE 1 MG/ML
INJECTION INTRAMUSCULAR; INTRAVENOUS AS NEEDED
Status: DISCONTINUED | OUTPATIENT
Start: 2025-05-05 | End: 2025-05-05 | Stop reason: SURG

## 2025-05-05 RX ORDER — NALOXONE HYDROCHLORIDE 0.4 MG/ML
0.08 INJECTION, SOLUTION INTRAMUSCULAR; INTRAVENOUS; SUBCUTANEOUS AS NEEDED
Status: DISCONTINUED | OUTPATIENT
Start: 2025-05-05 | End: 2025-05-05

## 2025-05-05 RX ORDER — MEPERIDINE HYDROCHLORIDE 25 MG/ML
12.5 INJECTION INTRAMUSCULAR; INTRAVENOUS; SUBCUTANEOUS AS NEEDED
Status: DISCONTINUED | OUTPATIENT
Start: 2025-05-05 | End: 2025-05-05

## 2025-05-05 RX ORDER — DEXTROSE MONOHYDRATE 25 G/50ML
50 INJECTION, SOLUTION INTRAVENOUS
Status: DISCONTINUED | OUTPATIENT
Start: 2025-05-05 | End: 2025-05-05

## 2025-05-05 RX ORDER — HYDROMORPHONE HYDROCHLORIDE 1 MG/ML
0.4 INJECTION, SOLUTION INTRAMUSCULAR; INTRAVENOUS; SUBCUTANEOUS EVERY 5 MIN PRN
Status: DISCONTINUED | OUTPATIENT
Start: 2025-05-05 | End: 2025-05-05

## 2025-05-05 RX ORDER — DEXAMETHASONE SODIUM PHOSPHATE 4 MG/ML
VIAL (ML) INJECTION AS NEEDED
Status: DISCONTINUED | OUTPATIENT
Start: 2025-05-05 | End: 2025-05-05 | Stop reason: SURG

## 2025-05-05 RX ORDER — HYDROCODONE BITARTRATE AND ACETAMINOPHEN 5; 325 MG/1; MG/1
2 TABLET ORAL ONCE AS NEEDED
Status: DISCONTINUED | OUTPATIENT
Start: 2025-05-05 | End: 2025-05-05

## 2025-05-05 RX ORDER — HYDROCODONE BITARTRATE AND ACETAMINOPHEN 5; 325 MG/1; MG/1
1 TABLET ORAL ONCE AS NEEDED
Status: DISCONTINUED | OUTPATIENT
Start: 2025-05-05 | End: 2025-05-05

## 2025-05-05 RX ORDER — HYDROMORPHONE HYDROCHLORIDE 1 MG/ML
0.6 INJECTION, SOLUTION INTRAMUSCULAR; INTRAVENOUS; SUBCUTANEOUS EVERY 5 MIN PRN
Status: DISCONTINUED | OUTPATIENT
Start: 2025-05-05 | End: 2025-05-05

## 2025-05-05 RX ORDER — PROCHLORPERAZINE EDISYLATE 5 MG/ML
5 INJECTION INTRAMUSCULAR; INTRAVENOUS EVERY 8 HOURS PRN
Status: DISCONTINUED | OUTPATIENT
Start: 2025-05-05 | End: 2025-05-05

## 2025-05-05 RX ORDER — NICOTINE POLACRILEX 4 MG
15 LOZENGE BUCCAL
Status: DISCONTINUED | OUTPATIENT
Start: 2025-05-05 | End: 2025-05-05

## 2025-05-05 RX ADMIN — MIDAZOLAM HYDROCHLORIDE 2 MG: 1 INJECTION INTRAMUSCULAR; INTRAVENOUS at 08:45:00

## 2025-05-05 RX ADMIN — LIDOCAINE HYDROCHLORIDE 50 MG: 10 INJECTION, SOLUTION EPIDURAL; INFILTRATION; INTRACAUDAL; PERINEURAL at 08:47:00

## 2025-05-05 RX ADMIN — KETOROLAC TROMETHAMINE 30 MG: 30 INJECTION, SOLUTION INTRAMUSCULAR; INTRAVENOUS at 09:16:00

## 2025-05-05 RX ADMIN — DEXAMETHASONE SODIUM PHOSPHATE 8 MG: 4 MG/ML VIAL (ML) INJECTION at 08:48:00

## 2025-05-05 NOTE — DISCHARGE INSTRUCTIONS
After hysteroscopy & IUD insertion instructions:  After IUD insertion, you may have some intermittent cramping & irregular bleeding.   This should improve over about 1 month's time at which time we will check on the IUD strings in the office on exam.     Nothing in the vagina for 2 weeks.  No strenuous activity/exercise for 48 hours (it can increase bleeding from the uterus.)   Please still take frequent walks. Stairs are fine.   No tub baths for 2 weeks.   May shower.    Please call office if:  -fever 100.4 or higher    Please proceed to the Emergency Department at [facility] for any of the following:   -vaginal bleeding soaking greater than 1 pad per hour  -severe pelvic pain  -shortness of breath  -chest pain  -leg pain or swelling

## 2025-05-05 NOTE — ANESTHESIA POSTPROCEDURE EVALUATION
[facility]    Ciara Che Patient Status:  Hospital Outpatient Surgery   Age/Gender 29 year old female MRN [id number]   Location [facility] SURGERY Attending Iraida Irvin MD   Hosp Day # 0 PCP None Pcp       Anesthesia Post-op Note    Truclear hysteroscopy, dilation and curettage, polypectomy, and insertion of Mirena intrauterine device    Procedure Summary       Date: 05/05/25 Room / Location:  MAIN OR 01 / [facility] MAIN OR    Anesthesia Start: 0842 Anesthesia Stop: 0928    Procedure: Truclear hysteroscopy, dilation and curettage, polypectomy, and insertion of Mirena intrauterine device (Uterus) Diagnosis:       Abnormal uterine bleeding (AUB)      (Abnormal uterine bleeding (AUB) [N93.9])    Surgeons: Iraida Irvin MD Anesthesiologist: Nolan White DO    Anesthesia Type: MAC ASA Status: 1            Anesthesia Type: MAC    Vitals Value Taken Time   BP 12/80 05/05/25 09:29   Temp 97 05/05/25 09:29   Pulse 81 05/05/25 09:29   Resp 16 05/05/25 09:29   SpO2 100 05/05/25 09:29           Patient Location: Same Day Surgery    Anesthesia Type: MAC    Airway Patency: patent    Postop Pain Control: adequate    Mental Status: preanesthetic baseline    Nausea/Vomiting: none    Cardiopulmonary/Hydration status: stable euvolemic    Complications: no apparent anesthesia related complications    Postop vital signs: stable    Dental Exam: Unchanged from Preop

## 2025-05-05 NOTE — H&P
[facility]  Obstetrics and Gynecology  H&P  Established    CC: Abnormal uterine bleeding.        Subjective:     Ciara Che is a 29 year old  female with abnormal uterine bleeding.     Mandarin  not needed today    AUB since her delivery 24. Having intermittent bleeding for 7-9 days at a time about 15-17 days from the onset of one episode to the next, painless & lighter than usual periods. Also with intermittent vaginal spotting. Possible anovulatory bleeding, but could also have retained placental fragments, endometrial polyp, etc.     As of 3/20/25 patient was hoping to Mirena IUD inserted, but was still having AUB. Urine pregnancy test negative. Recommended hold off on Mirena IUD insertion today & check pelvic ultrasound first.     Pelvic US 4/10/25 with small vascular lesion in endometrial cavity measuring 1.7 x 0.9 x 1.8 cm, suspicious for endometrial polyp, though differential diagnosis includes retained placental tissue or placental tumor. Recommend hysteroscopy, dilation and curettage, possible polypectomy, possible Mirena IUD insertion in the OR (if patient is still thinking she would like Mirena IUD inserted.)     Today, 2025 patient reports after her last visit in the office she passed a little something that looked like a small clump of tissue to her, about a nickel size. She took a picture of it in the toilet and showed me. She has not had any more apparent tissue pass, but when she wipes she still sees bright red tinged discharge or blood.     Patient Care Team:  Pcp, None as PCP - Clementine Harden, RN, SHERLY as Registered Nurse     Review of Systems    GYN Hx  Periods prior to pregnancy:   -irregular. Has had some prolonged bleeding in the past. About once a month on average but some spotting after that since around age 19. Not like that every year. Maybe stress was part of it? The first 4 days of her periods would be heavy bleeding & cramping.      Prior to pregnancy would have some postcoital spotting - reports a previous GYN knew about this & gave her Clomid. After she took the Clomid, the postcoital spotting stopped.     Took clomid to conceive 24 baby.   HPV vaccine N    Abnormal pap? N  Pap neg 24     OB History    Para Term  AB Living   2 1 1  1 1   SAB IAB Ectopic Multiple Live Births   1   0 1      # Outcome Date GA Lbr Gilson/2nd Weight Sex Type Anes PTL Lv   2 Term 24 39w1d 07:30 / 01:47 7 lb 15.7 oz (3.62 kg) M Caesarean EPI N LESLEY      Complications: Late decelerations, Other - see comments, Group B beta strep +, Fetal tachycardia, Intraamniotic Infection   1 SAB 23 6w4d    SAB   FD      Birth Comments: 6w4d IUFD by CRL. HCG 16,286 -> falling with cramping & bleeding.      Obstetric Comments   2024 - male \"Ghanshyam\" - Clomid pregnancy. Irregular menses. Diet controlled gestational diabetes (suboptimally controlled, A1c 5.7% on admission to L&D for delivery.) GBS carrier. Presented with rupture of membranes and non painful contractions. Augmented with IV oxytocin. Fetal tachycardia with intermittent variable and late decelerations. Developed fever consistent with intra-amniotic infection/chorioamnionitis. Suspected cephalopelvic disproportion. Non reassuring fetal status remote from delivery (rim/100/-1 skull with caput to +1 for over 2 hours) ->  section. Fetus direct occiput posterior. Fetal head easily disengaged and elevated out of hysterotomy. Placenta: Acute chorioamnionitis noted as was clinically suspected. Focal placental villous infarctions (involving <1% of total placental volume). Focal intervillous thrombi.      Consider aspirin in future pregnancies.      Past Medical History:   Diagnosis Date    Abnormal pelvic ultrasound 04/10/2025    4/10/25 - Pelvic US for AUB  Uterus anteverted, slightly heterogeneous myometrium  Focal vascular lesion noted in endometrial cavity measuring 1.7 x 0.9  x 1.8 cm  Bilateral ovaries with polycystic morphology & increased volumes. Right ovarian volume 23.2 mL. Left ovarian volume 15.2 mL.   [ ] hysteroscopy, D&C, possible polypectomy, possible Mirena IUD insertion.       Acne vulgaris     focused mainly on chin & upper neck. Has seen derm    Clomid pregnancy (HCC) 2024    Pregnancy conceived via Clomid    Gestational diabetes (HCC) 2024    diet controlled. HbA1c 5.7% on admission to L&D at 39 wk    Irregular menses 2023    Since prior to 3/2023    Language barrier     Primary language Mandarin. Needs  for more technical terms    Postcoital bleeding     Prior to 6109-2023 pregnancy would have some postcoital spotting - reports a previous GYN knew about this & gave her Clomid. After she took the Clomid, the postcoital spotting stopped.    Prediabetes 2025    3/25/25 A1c 5.7%, elevated fasting on 2 hr GTT.    Screening for genetic disease carrier status 2024    [employer] Carrier Screen = NEGATIVE FOR 14 OUT OF 14 DISEASES      Past Surgical History:   Procedure Laterality Date      2024    PLTCS at 39w1d - Non reassuring fetal status remote from delivery (rim/100/-1 skull with caput to +1 for over 2 hours). Suspected cephalopelvic disproportion.Fetus occiput posterior. Fetal head easily disengaged and elevated out of hysterotomy. Dr. Iraida Irvin, [facility]       Allergies:  Allergies[1]    Medications:  No current outpatient medications on file.        Social History     Socioeconomic History    Marital status:    Tobacco Use    Smoking status: Never    Smokeless tobacco: Never   Vaping Use    Vaping status: Never Used   Substance and Sexual Activity    Alcohol use: Not Currently    Drug use: Never    Sexual activity: Not Currently     Partners: Male   Other Topics Concern    Caffeine Concern No    Exercise No    Seat Belt No    Special Diet No    Stress Concern No    Weight Concern No     Social Drivers of  Health     Food Insecurity: No Food Insecurity (12/19/2024)    Food Insecurity     Food Insecurity: Never true   Transportation Needs: No Transportation Needs (12/20/2024)    Transportation Needs     Lack of Transportation: No     Car Seat: Yes   Recent Concern: Transportation Needs - Unmet Transportation Needs (12/19/2024)    Transportation Needs     Lack of Transportation: No     Car Seat: No   Stress: No Stress Concern Present (12/19/2024)    Stress     Feeling of Stress : No   Housing Stability: Low Risk  (12/19/2024)    Housing Stability     Housing Instability: No     Crib or Bassinette: Yes        Family History   Problem Relation Age of Onset    No Known Problems Son     Diabetes Neg     Thyroid disease Neg     Birth Defects Neg     Genetic Disease Neg        Depression Scale Total: 1 (2/6/2025 11:09 AM)      Depression Screening (PHQ-2/PHQ-9): Over the LAST 2 WEEKS                        Objective:     Vitals:    04/25/25 1123 05/05/25 0734   BP:  110/80   Pulse:  84   Resp:  16   Temp:  98.4 °F (36.9 °C)   TempSrc:  Oral   SpO2:  98%   Weight: 129 lb (58.5 kg) 128 lb 6.4 oz (58.2 kg)   Height: 61\"          Body mass index is 24.26 kg/m².  Physical Exam  Vitals and nursing note reviewed.   Constitutional:       Appearance: Normal appearance.   Neurological:      General: No focal deficit present.      Mental Status: She is alert.   Psychiatric:         Mood and Affect: Mood normal.         Behavior: Behavior normal.         Thought Content: Thought content normal.         Judgment: Judgment normal.           Labs:       Component      Latest Ref Rng 3/25/2025 4/28/2025 5/5/2025   WBC      4.0 - 11.0 x10(3) uL 7.7  8.0     RBC      3.80 - 5.30 x10(6)uL 4.51  4.46     Hemoglobin      12.0 - 16.0 g/dL 13.9  14.0     Hematocrit      35.0 - 48.0 % 41.3  41.3     Platelet Count      150.0 - 450.0 10(3)uL 302.0  275.0     MCV      80.0 - 100.0 fL 91.6  92.6     MCH      26.0 - 34.0 pg 30.8  31.4     MCHC      31.0  - 37.0 g/dL 33.7  33.9     RDW      % 13.1  12.7     Prelim Neutrophil Abs      1.50 - 7.70 x10 (3) uL 4.34      Neutrophils Absolute      1.50 - 7.70 x10(3) uL 4.34      Lymphocytes Absolute      1.00 - 4.00 x10(3) uL 2.85      Monocytes Absolute      0.10 - 1.00 x10(3) uL 0.37      Eosinophils Absolute      0.00 - 0.70 x10(3) uL 0.13      Basophils Absolute      0.00 - 0.20 x10(3) uL 0.03      Immature Granulocyte Absolute      0.00 - 1.00 x10(3) uL 0.02      Neutrophils %      % 56.0      Lymphocytes %      % 36.8      Monocytes %      % 4.8      Eosinophils %      % 1.7      Basophils %      % 0.4      Immature Granulocyte %      % 0.3      Glucose      70 - 99 mg/dL 95      Sodium      136 - 145 mmol/L 139      Potassium      3.5 - 5.1 mmol/L 4.0      Chloride      98 - 112 mmol/L 103      Carbon Dioxide, Total      21.0 - 32.0 mmol/L 28.0      ANION GAP      0 - 18 mmol/L 8      BUN      9 - 23 mg/dL 11      CREATININE      0.55 - 1.02 mg/dL 0.77      CALCIUM      8.7 - 10.6 mg/dL 10.2      CALCULATED OSMOLALITY      275 - 295 mOsm/kg 287      EGFR      >=60 mL/min/1.73m2 106      AST (SGOT)      <34 U/L 24      ALT (SGPT)      10 - 49 U/L 27      ALKALINE PHOSPHATASE      37 - 98 U/L 96      Total Bilirubin      0.3 - 1.2 mg/dL 0.7      PROTEIN, TOTAL      5.7 - 8.2 g/dL 7.8      Albumin      3.2 - 4.8 g/dL 4.9 (H)      Globulin      2.0 - 3.5 g/dL 2.9      A/G Ratio      1.0 - 2.0  1.7      Patient Fasting for CMP? Yes      Cholesterol, Total      <200 mg/dL 187      HDL Cholesterol      40 - 59 mg/dL 46      Triglycerides      30 - 149 mg/dL 148      LDL Cholesterol Calc      <100 mg/dL 115 (H)      VLDL      0 - 30 mg/dL 26      NON-HDL CHOLESTEROL      <130 mg/dL 141 (H)      Patient Fasting for Lipid? Yes      HEMOGLOBIN A1c      <5.7 % 5.7 (H)      ESTIMATED AVERAGE GLUCOSE      68 - 126 mg/dL 117      FASTING GLUCOSE      See comment mg/dL 95 (H)      1 HR GLUCOSE      See comment mg/dL 153      2 HR  GLUCOSE      See comment mg/dL 119      TSH      0.550 - 4.780 uIU/mL 1.070      POCT urine pregnancy      Negative    Negative       PROCEDURE:  US PELVIS W EV (CPT=76856,90337)     COMPARISON:  None.     INDICATIONS:  N93.9 Abnormal uterine bleeding (AUB)     TECHNIQUE:  Pelvic ultrasound using transabdominal and endovaginal technique.  Transvaginal ultrasound was used to better evaluate adnexal and endometrial detail.     PATIENT STATED HISTORY: (As transcribed by Technologist)  Patient has abnormal uterine bleeding.         FINDINGS:                TRANSABDOMINAL ULTRASOUND:  UTERUS: The uterus measures 8.9 x 4.4 x 5.7 cm. The endometrium appears unremarkable on transabdominal images.  .       Small amount of free pelvic fluid.     TRANSVAGINAL ULTRASOUND:  UTERUS:. Endometrial stripe measures:  9 mm in diameter.  Endometrium has heterogeneous appearance.  There is a heterogeneous appearing nodule within the medial measuring approximately 1.7 x 0.9 x 1.8 cm.  Internal vascularity is noted.       OVARIES:  RIGHT OVARY: Right ovary measures 3.6 x 3.2 x 3.9 cm.  Follicles within the ovary     LEFT OVARY: Left ovary is visualized and measures 3.0 x 2.2 x 4.4 cm.  Follicles within the ovary.     VASCULARITY: Normal flow is documented with color Doppler imaging                      Impression   CONCLUSION:    Heterogeneous slightly hyperechoic nodule within the endometrium measuring 1.7 x 0.9 x 1.8 cm may represent a polyp.  If further evaluation is needed, consider hysterosonogram.        LOCATION:  [place]        Dictated by (CST): Duke Harrell MD on 4/10/2025 at 11:57 AM      Finalized by (CST): Duke Harrell MD on 4/10/2025 at 11:59 AM       Assessment:     Ciara Che is a 29 year old  female - AUB since her delivery 24. Pelvic US with small vascular lesion in endometrial cavity measuring 1.7 x 0.9 x 1.8 cm, suspicious for endometrial polyp, though differential diagnosis includes retained  placental tissue or placental tumor. Recommend hysteroscopy, dilation and curettage, possible polypectomy, possible Mirena IUD insertion in the OR. Patient confirms today that she would like Mirena IUD inserted while she is under anesthesia.    Diagnoses and all orders for this visit:    Abnormal uterine bleeding  -     CBC, Platelet; No Differential; Future    Other orders  -     Vital signs; Standing  -     Notify anesthesia vital signs; Standing  -     PAT to instruct patients; Standing  -     acetaminophen (Tylenol Extra Strength) tab 1,000 mg  -     scopolamine (Transderm-Scop) 1 MG/3DAYS patch 1 patch  -     NPO; Standing  -     POCT Pregnancy, Urine; Standing  -     Insert/Maintain PIV; Standing  -     lactated ringers infusion  -     Activity as tolerated Until Discontinued; Standing  -     Height and weight; Standing  -     Notify physician; Standing  -     Verify informed consent; Standing  -     POCT Pregnancy, Urine; Standing  -     POCT Pregnancy, Urine; Standing  -     Discharge patient; Standing         Plan:     AUB, postpartum  -reports 1 day of brighter red bleeding on 2/4/25 - not clear if this was 1st period or not  -urine preg test NEG 2/6/2025   -no malodor on exam & uterus non tender. No CMT  -not breastfeeding, so could be menses have now resumed. H/o irregular menses with odd spotting prior to pregnancy  -took Augmentin x 10 days for possible endometritis  -3/20/25 Still having abnormal bleeding about every 15-17 days will have 7-9 days of bleeding with some days of red spotting in between. Urine preg test NEG. Recommend labs as previously ordered & obtain pelvic ultrasound due to potential for retained placental fragment, endometrial polyp, GTN. Hold off on Mirena IUD insertion. Patient in agreement.   -4/10/25 Pelvic US with small vascular lesion in endometrial cavity measuring 1.7 x 0.9 x 1.8 cm, suspicious for endometrial polyp, though differential diagnosis includes retained placental  tissue or placental tumor.   -Recommend hysteroscopy, dilation and curettage, possible polypectomy, possible Mirena IUD insertion in the OR (if patient is still thinking she would like Mirena IUD inserted.) Risks, benefits discussed. Patient does wish to proceed with Mirena IUD insertion.    S/p PLTCS   -Recovering well from delivery.   -not a great candidate for TOLAC in future     GDM diet   -suboptimally controlled. A1c 5.7% on admission to L&D  -screening for DM2 in postpartum period recommended   A1c elevated to 5.7% - prediabetic range   2 hr glucose test with 1 elevated fasting level. This is consistent with prediabetes as well.   -encouraged to establish care with a PCP    Prediabetes  -3/25/25 A1c 5.7% & 2 hr glucose test with 1 elevated fasting level.  -Recommend  Work on diet & exercise.   Specifically for insulin resistance - cut back on simple carbs/sugars   -elevated insulin makes you hungry & makes it difficult to lose weight   Do not eat carbs alone - always try to have some fat & protein, fiber with them. Eat the carbs last in your meal. Carbs - Try to think of them as dessert   High fiber, high protein. Leafy greens help a lot.   -aim for 25-28 grams of fiber per day. Can add in psyllium husk supplement (see below)   -aim for  grams of protein per day   Avoid processed foods   Walk 10-15 min after every meal   Weight lifting 30 min three times per week   -goal is to challenge & build the muscles as these tissues are much more metabolically active than fat cells & will help with glucose regulation and metabolism   Start psyllium fiber supplement daily - helps stabilize blood sugars.   Magnesium glycinate 500 mg nightly - helps body use insulin better.   Adequate sleep very important to keep cortisol level lower. Try to get to sleep by 10 pm for best quality sleep.   Follow up with primary care physician - need to be monitored to make sure not developing true diabetes mellitus.     Abnormal  placenta  -Path Focal placental villous infarctions (involving <1% of total placental volume). Focal intervillous thrombi.  -Consider aspirin in future pregnancies.     Irregular menses, took Clomid to conceive this pregnancy  -5/20/24 TSH 0.859 - 1st trimester   -progestin containing method of contraception encouraged  -Mirena IUD desired. Plan insertion in OR 5/5/2025     Pap neg 5/20/24  -Is up to date by current ASCCP guidelines.     HPV vaccine discussed & info given   Contraception - Mirena IUD insertion planned 5/5/2025   Continued folic acid encouraged   Carrier screen neg     To OR. Well woman exam due after 5/20/25.     Iraida Irvin MD  [facility] - OBGYN            [1] No Known Allergies

## 2025-05-05 NOTE — ANESTHESIA PREPROCEDURE EVALUATION
PRE-OP EVALUATION    Patient Name: Ciara Che    Admit Diagnosis: Abnormal uterine bleeding (AUB) [N93.9]    Pre-op Diagnosis: Abnormal uterine bleeding (AUB) [N93.9]    Truclear hysteroscopy, dilation and curettage, possible polypectomy, possible insertion of Mirena intrauterine device    Anesthesia Procedure: Truclear hysteroscopy, dilation and curettage, possible polypectomy, possible insertion of Mirena intrauterine device    Surgeons and Role:     * Iraida Irvin MD - Primary    Pre-op vitals reviewed.        Body mass index is 24.37 kg/m².    Current medications reviewed.  Hospital Medications:  Current Medications[1]    Outpatient Medications:   Prescriptions Prior to Admission[2]    Allergies: Patient has no known allergies.      Anesthesia Evaluation    Patient summary reviewed.    Anesthetic Complications           GI/Hepatic/Renal    Negative GI/hepatic/renal ROS.                             Cardiovascular    Negative cardiovascular ROS.        MET: >4                                           Endo/Other    Negative endo/other ROS.                              Pulmonary    Negative pulmonary ROS.                       Neuro/Psych    Negative neuro/psych ROS.                          Uterine bleeding        Past Surgical History[3]  Social Hx on file[4]  History   Drug Use Unknown     Available pre-op labs reviewed.  Lab Results   Component Value Date    WBC 8.0 04/28/2025    RBC 4.46 04/28/2025    HGB 14.0 04/28/2025    HCT 41.3 04/28/2025    MCV 92.6 04/28/2025    MCH 31.4 04/28/2025    MCHC 33.9 04/28/2025    RDW 12.7 04/28/2025    .0 04/28/2025     Lab Results   Component Value Date     03/25/2025    K 4.0 03/25/2025     03/25/2025    CO2 28.0 03/25/2025    BUN 11 03/25/2025    CREATSERUM 0.77 03/25/2025    GLU 95 03/25/2025    CA 10.2 03/25/2025            Airway      Mallampati: II  Mouth opening: >3 FB  TM distance: > 6 cm  Neck ROM: full Cardiovascular    Cardiovascular  exam normal.         Dental  Comment: No loose, chipped, cracked or missing teeth per patient            Pulmonary    Pulmonary exam normal.                 Other findings              ASA: 1   Plan: MAC  NPO status verified and         Comment:   Plan is MAC anesthesia, which likely will include deep sedation.  Implied that memory of procedure is unlikely although intraop recall, if it occurs, may be a reasonable and comfortable experience with this anesthetic.  Aware that general anesthesia is not intended though deep sedation may include brief moments of general anesthesia.   Questions answered. Accepts. The consent was signed without further questions.   Plan/risks discussed with: patient                Present on Admission:  **None**             [1]    acetaminophen (Tylenol Extra Strength) tab 1,000 mg  1,000 mg Oral Once    scopolamine (Transderm-Scop) 1 MG/3DAYS patch 1 patch  1 patch Transdermal Once    lactated ringers infusion   Intravenous Continuous   [2]   Medications Prior to Admission   Medication Sig Dispense Refill Last Dose/Taking    spironolactone 100 MG Oral Tab Take 1 tablet (100 mg total) by mouth in the morning.   Taking    prenatal vitamin with DHA 27-0.8-228 MG Oral Cap Take 1 capsule by mouth in the morning.   Taking    [] amoxicillin clavulanate 875-125 MG Oral Tab Take 1 tablet by mouth 2 (two) times daily for 10 days. (Patient not taking: Reported on 2025) 20 tablet 0 Not Taking   [3]   Past Surgical History:  Procedure Laterality Date      2024    PLTCS at 39w1d - Non reassuring fetal status remote from delivery (rim/100/-1 skull with caput to +1 for over 2 hours). Suspected cephalopelvic disproportion.Fetus occiput posterior. Fetal head easily disengaged and elevated out of hysterotomy. Dr. Iraida Irvin, [facility]   [4]   Social History  Socioeconomic History    Marital status:    Tobacco Use    Smoking status: Never    Smokeless tobacco:  Never   Vaping Use    Vaping status: Never Used   Substance and Sexual Activity    Alcohol use: Not Currently    Drug use: Never    Sexual activity: Not Currently     Partners: Male   Other Topics Concern    Caffeine Concern No    Exercise No    Seat Belt No    Special Diet No    Stress Concern No    Weight Concern No

## 2025-05-05 NOTE — OPERATIVE REPORT
Operative Report:     Ciara Che  : 1995     Date of procedure: 25    INDICATIONS:   29 year old  female with abnormal uterine bleeding since her delivery via  section on 24. Patient with history of irregular menses prior to pregnancy. Urine pregnancy test was negative. She was given Augmentin x 10 days for possible endometritis but continued to have unusual bleeding. Pelvic US with small vascular lesion in endometrial cavity measuring 1.7 x 0.9 x 1.8 cm, suspicious for endometrial polyp, though differential diagnosis includes retained placental tissue or placental tumor. Recommend hysteroscopy, dilation and curettage, possible polypectomy, possible Mirena IUD insertion in the OR. Patient confirms today that she would like Mirena IUD inserted while she is under anesthesia.     PRE-OP DIAGNOSIS:   Abnormal uterine bleeding  Abnormal pelvic ultrasound suspicious for possible polyp tissue versus retained placental tissue  Requests insertion of Mirena intrauterine device        POST-OP DIAGNOSIS:   Abnormal uterine bleeding  Endometrial polyp  Status post insertion of Mirena intrauterine device     PROCEDURE(S):   Hysteroscopic polypectomy with a few random sampling of endometrium of uterus using Truclear hysteroscopic morcellator, insertion of Mirena intrauterine device     ANESTHESIA: MAC with paracervical block   ANTIBIOTICS: IV doxycycline    SURGEON(S): Iraida Irvin MD     ESTIMATED BLOOD LOSS: 10 mL           DRAINS: None   mL light yellow urine via straight catheterization at the start of the procedure            UTERINE DISTENSION MEDIUM: Normal Saline 0.9%  DEFICIT: 650 mL     SPECIMENS: Endometrial polyp with endometrial and endocervical curettings             COMPLICATIONS:  None apparent     FINDINGS: Normal external genitalia, no lesions. Normal cervix, no lesions. Anteverted uterus. Uterus sounded to 8 cm. Endometrial cavity normal in shape. Endometrium  normal in appearance. Approximately 1.5 cm polyp or polypoid fibroid arising from the posterior lateral endometrium just inferior to the tubal ostium on the right. Bilateral tubal ostia seen.     PROCEDURE: This procedure was fully reviewed with the patient and written informed consent was obtained after discussing risks, benefits, indication and alternatives. All questions were answered.     The patient was taken to operating room. SCDs were placed. MAC was initiated. She was placed in dorsal lithotomy position. Exam under anesthesia performed. She was prepped and draped in normal sterile fashion. The bladder was drained. A sterile bivalved speculum was placed in the vagina. A single tooth tenaculum was used to grasp the anterior lip of the cervix. The cervix was gently dilated with Hegar dilators to 8 mm. Uterus was sounded to 8 cm.     The hysteroscopic system was calibrated. The hysteroscope was gently advanced into the endometrial cavity. The uterine cavity was visualized and the previous findings noted. The TruClear soft tissue Mini hysteroscopic blade was then advanced through the hysteroscope under direct visualization to resect the endometrial polyp vs fibroid and to take a few random samples the tissue of the endometrium along all the walls of the uterus and endocervix. The tissue was sent to pathology.     The hysteroscope was then removed from the uterus. The Mirena levonorgestrel intrauterine device was inserted per standard  procedure. The strings were cut to approximately 3 cm. The single tooth tenaculum was removed and good hemostasis was noted. Sponge, lap, needle, and instrument counts correct by two counts. The patient was taken to recovery room in stable condition.     DISPOSITION:  To recovery room in stable condition        CONDITION: Stable      Iraida Irvin MD   [facility]

## 2025-05-05 NOTE — INTERVAL H&P NOTE
Pre-op Diagnosis: Abnormal uterine bleeding (AUB) [N93.9]    The above referenced H&P was reviewed by Iraida Irvin MD on 5/5/2025, the patient was examined and no significant changes have occurred in the patient's condition since the H&P was performed.  I discussed with the patient and/or legal representative the potential benefits, risks and side effects of this procedure; the likelihood of the patient achieving goals; and potential problems that might occur during recuperation.  I discussed reasonable alternatives to the procedure, including risks, benefits and side effects related to the alternatives and risks related to not receiving this procedure.  We will proceed with procedure as planned.

## 2025-05-30 ENCOUNTER — OFFICE VISIT (OUTPATIENT)
Facility: CLINIC | Age: 30
End: 2025-05-30
Payer: COMMERCIAL

## 2025-05-30 VITALS
BODY MASS INDEX: 23.91 KG/M2 | WEIGHT: 126.63 LBS | SYSTOLIC BLOOD PRESSURE: 104 MMHG | HEART RATE: 72 BPM | HEIGHT: 61 IN | DIASTOLIC BLOOD PRESSURE: 66 MMHG

## 2025-05-30 DIAGNOSIS — Z98.890 STATUS POST HYSTEROSCOPIC POLYPECTOMY: ICD-10-CM

## 2025-05-30 DIAGNOSIS — Z48.89 POSTOPERATIVE VISIT: ICD-10-CM

## 2025-05-30 DIAGNOSIS — N92.6 IRREGULAR MENSES: ICD-10-CM

## 2025-05-30 DIAGNOSIS — Z30.431 ENCOUNTER FOR ROUTINE CHECKING OF INTRAUTERINE CONTRACEPTIVE DEVICE (IUD): ICD-10-CM

## 2025-05-30 DIAGNOSIS — Z97.5 BREAKTHROUGH BLEEDING WITH IUD: Primary | ICD-10-CM

## 2025-05-30 DIAGNOSIS — R73.03 PREDIABETES: ICD-10-CM

## 2025-05-30 DIAGNOSIS — N92.1 BREAKTHROUGH BLEEDING WITH IUD: Primary | ICD-10-CM

## 2025-05-30 PROCEDURE — 3074F SYST BP LT 130 MM HG: CPT | Performed by: OBSTETRICS & GYNECOLOGY

## 2025-05-30 PROCEDURE — 3008F BODY MASS INDEX DOCD: CPT | Performed by: OBSTETRICS & GYNECOLOGY

## 2025-05-30 PROCEDURE — 3078F DIAST BP <80 MM HG: CPT | Performed by: OBSTETRICS & GYNECOLOGY

## 2025-05-30 PROCEDURE — 99459 PELVIC EXAMINATION: CPT | Performed by: OBSTETRICS & GYNECOLOGY

## 2025-05-30 PROCEDURE — 99214 OFFICE O/P EST MOD 30 MIN: CPT | Performed by: OBSTETRICS & GYNECOLOGY

## 2025-05-30 NOTE — PROGRESS NOTES
[facility]  Obstetrics and Gynecology   Postoperative Progress Note    CC:   Chief Complaint   Patient presents with    Follow - Up     Follow-up 25   Truclear hysteroscopy, dilation and curettage, polypectomy, and insertion of Mirena intrauterine device        Subjective:     Ciara Che is a 29 year old  female here for postoperative visit & IUD string check   Patient's last menstrual period was 2025 (exact date).   Chaperone declines      Mandarin  - declines today      25 Hysteroscopic polypectomy, random sampling of endometrium, insertion of Mirena IUD. Done for AUB postpartum after PLTCS on 24. H/o irregular menses prior to pregnancy. Urine pregnancy test was negative. She was given Augmentin x 10 days for possible endometritis but continued to have unusual bleeding. Pelvic US with small vascular lesion in endometrial cavity measuring 1.7 x 0.9 x 1.8 cm, suspicious for endometrial polyp, though differential diagnosis includes retained placental tissue or placental tumor. Pathology confirmed benign endometrial polyp  ~~~~~~~~~~~~~~~~~~~~~~  2025   She is feeling overall well except still having some intermittent vaginal bleeding & some cramping at times.     On 25 - passed clear mucus jelly with some blood in the background.   5/15- - very red blood that seemed like a period. Had some cramping around that time as well that seemed like what she gets when she is on her period.     Then some mild cramping on  & restarted bleeding - Bright red/fresh but less in amount.   Still small blood today. No cramping or pain today.     Vaginal bleeding - light   Discharge none  Fever N  BM normal   Urination normal   [place] since procedure? Nothing yet      Has dermatologist for acne. Was prescribed spironolactone & minocycline for acne on chin & neck.     Patient Care Team:  Pcp, None as PCP - General     Review of Systems     GYN Hx  Periods prior to  pregnancy:   -irregular. Has had some prolonged bleeding in the past. About once a month on average but some spotting after that since around age 19. Not like that every year. Maybe stress was part of it? The first 4 days of her periods would be heavy bleeding & cramping.      Prior to pregnancy would have some postcoital spotting - reports a previous GYN knew about this & gave her Clomid. After she took the Clomid, the postcoital spotting stopped.      Took clomid to conceive 24 baby.   Postpartum AUB  25 Hysteroscopic polypectomy, random sampling of endometrium, insertion of Mirena IUD    Contraception: Mirena IUD inserted 2025 (due for removal at 7 years, 2033)   HPV vaccine N     Abnormal pap? N  Pap neg 24     OB History    Para Term  AB Living   2 1 1  1 1   SAB IAB Ectopic Multiple Live Births   1   0 1      # Outcome Date GA Lbr [place]/2nd Weight Sex Type Anes PTL Lv   2 Term 24 39w1d 07:30 / 01:47 7 lb 15.7 oz (3.62 kg) M Caesarean EPI N LESLEY      Complications: Late decelerations, Other - see comments, Group B beta strep +, Fetal tachycardia, Intraamniotic Infection   1 SAB 23 6w4d    SAB   FD      Birth Comments: 6w4d IUFD by CRL. HCG 16,286 -> falling with cramping & bleeding.      Obstetric Comments   2024 - male \"Ghanshyam\" - Clomid pregnancy. Irregular menses. Diet controlled gestational diabetes (suboptimally controlled, A1c 5.7% on admission to L&D for delivery.) GBS carrier. Presented with rupture of membranes and non painful contractions. Augmented with IV oxytocin. Fetal tachycardia with intermittent variable and late decelerations. Developed fever consistent with intra-amniotic infection/chorioamnionitis. Suspected cephalopelvic disproportion. Non reassuring fetal status remote from delivery (rim/100/-1 skull with caput to +1 for over 2 hours) ->  section. Fetus direct occiput posterior. Fetal head easily disengaged and elevated out of  hysterotomy. Placenta: Acute chorioamnionitis noted as was clinically suspected. Focal placental villous infarctions (involving <1% of total placental volume). Focal intervillous thrombi.      Consider aspirin in future pregnancies.      Past Medical History:   Diagnosis Date    Abnormal pelvic ultrasound 04/10/2025    4/10/25 - Pelvic US for AUB  Uterus anteverted, slightly heterogeneous myometrium  Focal vascular lesion noted in endometrial cavity measuring 1.7 x 0.9 x 1.8 cm  Bilateral ovaries with polycystic morphology & increased volumes. Right ovarian volume 23.2 mL. Left ovarian volume 15.2 mL.   [ ] hysteroscopy, D&C, possible polypectomy, possible Mirena IUD insertion.       Acne vulgaris     focused mainly on chin & upper neck. Has seen derm    Clomid pregnancy (HCC) 2024    Pregnancy conceived via Clomid    Gestational diabetes (HCC) 2024    diet controlled. HbA1c 5.7% on admission to L&D at 39 wk    Irregular menses 2023    Since prior to 3/2023    Language barrier     Primary language Mandarin. Needs  for more technical terms    Postcoital bleeding     Prior to [id number] pregnancy would have some postcoital spotting - reports a previous GYN knew about this & gave her Clomid. After she took the Clomid, the postcoital spotting stopped.    Prediabetes 2025    3/25/25 A1c 5.7%, elevated fasting on 2 hr GTT.    Screening for genetic disease carrier status 2024    [employer] Carrier Screen = NEGATIVE FOR 14 OUT OF 14 DISEASES    Status post hysteroscopic polypectomy 2025      Past Surgical History:   Procedure Laterality Date      2024    PLTCS at 39w1d - Non reassuring fetal status remote from delivery (rim/100/-1 skull with caput to +1 for over 2 hours). Suspected cephalopelvic disproportion.Fetus occiput posterior. Fetal head easily disengaged and elevated out of hysterotomy. Dr. Iraida Irvin, [facility]    Hysteroscopy,with sampling  2025     Hysteroscopic polypectomy, sampling endometrium, insertion Mirena IUD. Dr. Iraida Irvin, [facility]. Path confirmed endometrial polyp    Insert intrauterine device  05/05/2025    Mirena IUD. Sounded 8 cm. Anteverted. Dr. Iraida Irvin       Allergies:  No Known Allergies    Medications:  Current Outpatient Medications   Medication Sig Dispense Refill    spironolactone 100 MG Oral Tab Take 1 tablet (100 mg total) by mouth in the morning.      prenatal vitamin with DHA 27-0.8-228 MG Oral Cap Take 1 capsule by mouth in the morning.          Social History     Socioeconomic History    Marital status:    Tobacco Use    Smoking status: Never    Smokeless tobacco: Never   Vaping Use    Vaping status: Never Used   Substance and Sexual Activity    Alcohol use: Not Currently    Drug use: Never    Sexual activity: Not Currently     Partners: Male     Birth control/protection: Mirena, I.U.D.     Comment: Mirena IUD insertion 5/5/25   Other Topics Concern    Caffeine Concern No    Exercise No    Seat Belt No    Special Diet No    Stress Concern No    Weight Concern No       Family History   Problem Relation Age of Onset    No Known Problems Son     Diabetes Neg     Thyroid disease Neg     Birth Defects Neg     Genetic Disease Neg          Objective:     Vitals:    05/30/25 1226   BP: 104/66   Pulse: 72   Weight: 126 lb 9.6 oz (57.4 kg)   Height: 61\"       Body mass index is 23.92 kg/m².    Physical Exam  Vitals and nursing note reviewed.   Constitutional:       Appearance: Normal appearance.   HENT:      Head: Normocephalic and atraumatic.   Eyes:      Extraocular Movements: Extraocular movements intact.      Conjunctiva/sclera: Conjunctivae normal.   Abdominal:      General: There is no distension.      Palpations: Abdomen is soft. There is no mass.      Tenderness: There is no abdominal tenderness. There is no guarding or rebound.      Hernia: No hernia is present.      Comments: +Pfannenstiel skin incision  scar intact   Genitourinary:     Comments: VULVA: no lesions  URETHRA: unremarkable   PERINEUM: intact  VAGINA: clear bright red blood tinged mucoid discharge at external cervical os/vaginal vault.  CERVIX: no lesions. IUD strings slightly long, trimmed to 2.5 cm.    UTERUS: uterus is normal size, shape, consistency and nontender  ADNEXA: normal adnexa in size, nontender and no masses  PELVIC FLOOR: mild to mod hypertonicity, no tenderness       Musculoskeletal:      Right lower leg: No edema.      Left lower leg: No edema.   Neurological:      General: No focal deficit present.      Mental Status: She is alert.   Psychiatric:         Mood and Affect: Mood normal.         Behavior: Behavior normal.         Thought Content: Thought content normal.         Judgment: Judgment normal.         Labs:       Pathology:  Case Report     Surgical Pathology                                Case: [id number]                                     Authorizing Provider:  Iraida Irvin MD       Collected:           2025 09:17 AM             Ordering Location:     [facility]    Received:            2025 12:20 PM             Pathologist:           Ford Cruz MD                                                               Specimen:    Endometrial curettage, Endometrial Curettings with Endometrial Polyp                          Final Diagnosis:     Endometrium, curettings with polyp:  - Fragments of benign endometrial polyp, background proliferative endometrium and associated myometrium.     Electronically signed by Ford Cruz MD on 2025 at 1046 CDT        Clinical Information      N93.9 Abnormal Uterine Bleeding (Aub).          Assessment:     Ciara Che is a 29 year old  female - postoperative visit & Mirena IUD string check. Still having irregular bleeding. IUD strings on the longer side today. IUD strings trimmed. Will check US to make sure IUD in good position.      Diagnoses and all  orders for this visit:    Breakthrough bleeding with IUD  -     US PELVIS W EV (CPT=76856/64715); Future  -     US PELVIS (TRANSABDOMINAL PELVIS)  (CPT=76856); Future  -     US PELVIS (TRANSVAGINAL ONLY) (CPT=76830); Future    Encounter for routine checking of intrauterine contraceptive device (IUD)    Status post hysteroscopic polypectomy    Postoperative visit    Prediabetes    Irregular menses         Plan:     AUB postpartum  -s/p 25 hysteroscopic polypectomy, endometrial biopsy, insertion Mirena IUD. Polyp confirmed on path   -Recovering well from procedure.  -Procedure findings & path reviewed with patient  -25 - having some intermittent bleeding with Mirena IUD in place for about the past 25 days. IUD strings on the longer side today (I do tend to leave the strings on the longer side at time of insertion to try to avoid the strings getting pulled up into the uterus such that removal is difficult). Strings trimmed to about 2.5 cm or so. Will check US to make sure IUD in good position. Reviewed irregular bleeding can be normal with Mirena IUD for the first few months. Hopefully will become less & less with time. Recommend condoms for back up contraception until IUD is confirmed to be in appropriate position.     S/p Mirena IUD insertion  -2025 - IUD strings slightly long. Trimmed to 2.5 cm or so. Will check US to make sure IUD in good position     Irregular menses  -Mirena IUD should help to manage this/protect endometrium    H/o infertility, took Clomid to conceive 1st baby  -could need ovulation induction agent again in future     Prediabetes, h/o GDM  -h/o suboptimally controlled GDM on diet in   -postpartum screen for DM2: +Prediabetes (A1c 5.7%, 2 hr glucose test with 1 elevated fasting level)  -encouraged to establish care with a PCP  -encouraged healthy diet & exercise, adequate sleep, stress management    H/o  x 1 (2024)  -done for non reassuring fetal status remote from  delivery (was not making good progress, (rim/100/-1 skull with caput to +1 for over 2 hours), developed chorio). Fetal head was not well engaged & was OP.   -not a great candidate for TOLAC    H/o abnormal placenta  -Path Focal placental villous infarctions (involving <1% of total placental volume). Focal intervillous thrombi.  -Consider aspirin in future pregnancies.     Contraception - Mirena IUD inserted 5/5/2025 (due for removal at 7 years, 5/2033)   HPV vaccine discussed & info given at previous.   Pap neg 5/20/24 - up to date   Carrier screen negative.     RTC well woman exam & follow up Mirena IUD in 3-4 months. Await pelvic US to check on IUD position.     Iraida Irvin MD  [facility]

## 2025-06-09 ENCOUNTER — MED REC SCAN ONLY (OUTPATIENT)
Facility: CLINIC | Age: 30
End: 2025-06-09

## 2025-06-19 ENCOUNTER — HOSPITAL ENCOUNTER (OUTPATIENT)
Dept: ULTRASOUND IMAGING | Age: 30
Discharge: HOME | End: 2025-06-19
Attending: OBSTETRICS & GYNECOLOGY
Payer: COMMERCIAL

## 2025-06-19 DIAGNOSIS — Z97.5 BREAKTHROUGH BLEEDING WITH IUD: ICD-10-CM

## 2025-06-19 DIAGNOSIS — N92.1 BREAKTHROUGH BLEEDING WITH IUD: ICD-10-CM

## 2025-06-19 PROBLEM — E28.2 POLYCYSTIC OVARIES: Status: ACTIVE | Noted: 2025-06-19

## 2025-06-19 PROBLEM — Z30.430 ENCOUNTER FOR IUD INSERTION: Status: RESOLVED | Noted: 2025-05-05 | Resolved: 2025-06-19

## 2025-06-19 PROBLEM — N84.0 ENDOMETRIAL POLYP: Status: RESOLVED | Noted: 2025-05-05 | Resolved: 2025-06-19

## 2025-06-19 PROBLEM — T83.32XA MALPOSITIONED INTRAUTERINE DEVICE (IUD): Status: ACTIVE | Noted: 2025-06-19

## 2025-06-19 PROCEDURE — 76830 TRANSVAGINAL US NON-OB: CPT | Performed by: OBSTETRICS & GYNECOLOGY

## 2025-06-19 PROCEDURE — 76856 US EXAM PELVIC COMPLETE: CPT | Performed by: OBSTETRICS & GYNECOLOGY

## 2025-06-23 NOTE — PROGRESS NOTES
Seen by patient Ciara Yane on 6/23/2025  2:39 PM Appointment scheduled for INTRAUTERINE DEVICE removal/insertion.

## 2025-07-17 ENCOUNTER — RESULTS FOLLOW-UP (OUTPATIENT)
Facility: CLINIC | Age: 30
End: 2025-07-17

## 2025-07-17 ENCOUNTER — OFFICE VISIT (OUTPATIENT)
Facility: CLINIC | Age: 30
End: 2025-07-17
Payer: COMMERCIAL

## 2025-07-17 VITALS
HEIGHT: 61.5 IN | BODY MASS INDEX: 23.45 KG/M2 | DIASTOLIC BLOOD PRESSURE: 76 MMHG | HEART RATE: 83 BPM | SYSTOLIC BLOOD PRESSURE: 110 MMHG | WEIGHT: 125.81 LBS

## 2025-07-17 DIAGNOSIS — Z30.40 ENCOUNTER FOR SURVEILLANCE OF CONTRACEPTIVES, UNSPECIFIED CONTRACEPTIVE: Primary | ICD-10-CM

## 2025-07-17 PROCEDURE — 3074F SYST BP LT 130 MM HG: CPT | Performed by: OBSTETRICS & GYNECOLOGY

## 2025-07-17 PROCEDURE — 3008F BODY MASS INDEX DOCD: CPT | Performed by: OBSTETRICS & GYNECOLOGY

## 2025-07-17 PROCEDURE — 99212 OFFICE O/P EST SF 10 MIN: CPT | Performed by: OBSTETRICS & GYNECOLOGY

## 2025-07-17 PROCEDURE — 3078F DIAST BP <80 MM HG: CPT | Performed by: OBSTETRICS & GYNECOLOGY

## 2025-07-17 PROCEDURE — 81025 URINE PREGNANCY TEST: CPT | Performed by: OBSTETRICS & GYNECOLOGY

## 2025-07-17 NOTE — PROGRESS NOTES
IUD Removal     Consent signed.  She does not smoke no contraindications to the birth control pill she has not been on it before wishes to start that.  Procedure discussed with the patient in detail including indication, risks, benefits, alternatives and complications.    Pelvic Exam Findings:  Lesion description:  IUD strings seen from cervix    Procedure:  Speculum placed in the vagina.  Betadine wash of vagina and cervix.  An Endocervical speculum was used to visualize strings.  A clamp used to grasp IUD strings.  Mirena IUD was removed without difficulty.  The patient tolerated the procedure well.      Visit Plan:  Discharge instructions were reviewed with the patient.

## (undated) DEVICE — SET TB INFLO FOR TRUCLEAR SYS HYSTEROLUX

## (undated) DEVICE — CATHETER,URETHRAL,REDRUBBER,STRL,16FR: Brand: MEDLINE

## (undated) DEVICE — SOLUTION IRRIG 3000ML 0.9% NACL FLX CONT

## (undated) DEVICE — GLOVE SUR 6.5 SENSICARE PI PIP CRM PWD F

## (undated) DEVICE — LARGE, DISPOSABLE ALEXIS O C-SECTION PROTECTOR - RETRACTOR: Brand: ALEXIS ® O C-SECTION PROTECTOR - RETRACTOR

## (undated) DEVICE — PACK GYNE CUSTOM

## (undated) DEVICE — ELITE HYSTEROSCOPE SEAL: Brand: TRUCLEAR

## (undated) DEVICE — SOLUTION IRRIG 1000ML 0.9% NACL USP BTL

## (undated) DEVICE — SLEEVE COMPR MD KNEE LEN SGL USE KENDALL SCD

## (undated) DEVICE — MEDI-VAC NON-CONDUCTIVE SUCTION TUBING: Brand: CARDINAL HEALTH

## (undated) DEVICE — SOFT TISSUE SHAVER MINI: Brand: TRUCLEAR

## (undated) DEVICE — NEEDLE SPNL 22GA L3.5IN BLK QNCKE STYL DISP

## (undated) DEVICE — DRAPE,TOWEL,LARGE,INVISISHIELD: Brand: MEDLINE

## (undated) DEVICE — SPECIMEN SOCK - STANDARD: Brand: MEDI-VAC

## (undated) DEVICE — HYSTEROSCOPIC OUTFLOW TUBE SET

## (undated) DEVICE — 2000CC GUARDIAN II: Brand: GUARDIAN

## (undated) NOTE — LETTER
Ciara Che, :1995    CONSENT FOR PROCEDURE/SEDATION    1. I authorize the performance upon Ciara Che  the following: Intrauterine Device Removal    2. I authorize Dr. Aleida Thakur MD (and whomever is designated as the doctor’s assistant), to perform the above-mentioned procedures.    3. If any unforeseen conditions arise during this procedure calling for additional  procedures, operations, or medications (including anesthesia and blood transfusion), I further request and authorize the doctor to do whatever he/she deems advisable in my interest.    4. I consent to the taking and reproduction of any photographs in the course of this procedure for professional purposes.    5. I consent to the administration of such sedation as may be considered necessary or advisable by the physician responsible for this service, with the exception of ______________________________________________________    6. I have been informed by my doctor of the nature and purpose of this procedure sedation, possible alternative methods of treatment, risk involved and possible complications.    7. If I have a Do Not Resuscitate (DNR) order in place, the physician and I (or the individual authorized to consent on my behalf) will discuss and agree as to whether the Do Not Resuscitate (DNR) order will remain in effect or will be discontinued during the performance of the procedure and the applicable recovery period. If the Do Not Resuscitate (DNR) order is discontinued and is to be reinstated following the procedure/recovery period, the physician will determine when the applicable recovery period ends for purposes of reinstating the Do Not Resuscitate (DNR) order.    Signature of Patient:_______________________________________________    Signature of person authorized to consent for patient:  _______________________________________________________________    Relationship to patient:  ____________________________________________    Witness: _________________________________________ Date:___________     Physician Signature: _______________________________ Date:___________

## (undated) NOTE — Clinical Note
Thank you for the referral! Patient seen today for GDM education. Please refer to note. I will see patient in 2 weeks for follow up, review food choices/diet, and review blood glucose readings.

## (undated) NOTE — LETTER
[facility] [place]  After Care Instructions for IUD      Bleeding   You may experience irregular bleeding for the fist 3-6 months.  If your bleeding becomes heavier than a normal menstrual cycle, please contact our office.    Pain  You may experience mild menstrual cramping after the IUD insertion that will typically last 24-48hrs.  You may take Ibuprofen, Tylenol or Aleve to relieve the discomfort.  If you experience severe or persistent pain, please contact our office.       Restrictions    You should avoid sexual intercourse or tampon use for 1 day after insertion.  Please use a backup method of contraception for 4-7 days with the Mirena and Sharri.    When to contact our office  If you are experiencing discomfort described as worse than menstrual cramps that is not relieved by ibuprofen   Fever of 100.4 or greater  Vaginal bleeding that is saturating 1 pad per hour    Any discomfort or poking sensation during intercourse or other sexual activity      Follow up in 4-6 weeks for IUD check.   If you have additional questions or concerns, please call us at [phone].

## (undated) NOTE — LETTER
[facility] – [place] Department of OB/GYN  After Care Instructions for IUD      Bleeding   You may experience irregular bleeding for the fist 3-6 months.  If your bleeding becomes heavier than a normal menstrual cycle, please contact our office.    Pain  You may experience mild menstrual cramping after the IUD insertion that will typically last 24-48hrs.  You may take Ibuprofen, Tylenol or Aleve to relieve the discomfort.  If you experience severe or persistent pain, please contact our office.       Restrictions    You should avoid sexual intercourse or tampon use for 1 day after insertion.  Please use a backup method of contraception for 4-7 days with the Mirena and Sharri.    When to contact our office  If you are experiencing discomfort described as worse than menstrual cramps that is not relieved by ibuprofen   Fever of 100.4 or greater  Vaginal bleeding that is saturating 1 pad per hour    Any discomfort or poking sensation during intercourse or other sexual activity      Follow up in 4-6 weeks for IUD check.   If you have additional questions or concerns, please call us at [phone].

## (undated) NOTE — LETTER
Ciara Che, :1995    CONSENT FOR PROCEDURE/SEDATION    1. I authorize the performance upon Ciara Che  the following: IUD ( mirena) insertion    2. I authorize Dr. Iraida Irvin MD (and whomever is designated as the doctor’s assistant), to perform the above-mentioned procedures.    3. If any unforeseen conditions arise during this procedure calling for additional  procedures, operations, or medications (including anesthesia and blood transfusion), I further request and authorize the doctor to do whatever he/she deems advisable in my interest.    4. I consent to the taking and reproduction of any photographs in the course of this procedure for professional purposes.    5. I consent to the administration of such sedation as may be considered necessary or advisable by the physician responsible for this service, with the exception of ______________________________________________________    6. I have been informed by my doctor of the nature and purpose of this procedure sedation, possible alternative methods of treatment, risk involved and possible complications.    7. If I have a Do Not Resuscitate (DNR) order in place, the physician and I (or the individual authorized to consent on my behalf) will discuss and agree as to whether the Do Not Resuscitate (DNR) order will remain in effect or will be discontinued during the performance of the procedure and the applicable recovery period. If the Do Not Resuscitate (DNR) order is discontinued and is to be reinstated following the procedure/recovery period, the physician will determine when the applicable recovery period ends for purposes of reinstating the Do Not Resuscitate (DNR) order.    Signature of Patient:_______________________________________________    Signature of person authorized to consent for patient:  _______________________________________________________________    Relationship to patient:  ____________________________________________    Witness: _________________________________________ Date:___________     Physician Signature: _______________________________ Date:___________

## (undated) NOTE — LETTER
Ciara Che, :1995    CONSENT FOR PROCEDURE/SEDATION    1. I authorize the performance upon Ciara Che  the following: IUD removal and re-insertion     2. I authorize Dr. Aleida Thakur MD (and whomever is designated as the doctor’s assistant), to perform the above-mentioned procedures.    3. If any unforeseen conditions arise during this procedure calling for additional  procedures, operations, or medications (including anesthesia and blood transfusion), I further request and authorize the doctor to do whatever he/she deems advisable in my interest.    4. I consent to the taking and reproduction of any photographs in the course of this procedure for professional purposes.    5. I consent to the administration of such sedation as may be considered necessary or advisable by the physician responsible for this service, with the exception of ______________________________________________________    6. I have been informed by my doctor of the nature and purpose of this procedure sedation, possible alternative methods of treatment, risk involved and possible complications.    7. If I have a Do Not Resuscitate (DNR) order in place, the physician and I (or the individual authorized to consent on my behalf) will discuss and agree as to whether the Do Not Resuscitate (DNR) order will remain in effect or will be discontinued during the performance of the procedure and the applicable recovery period. If the Do Not Resuscitate (DNR) order is discontinued and is to be reinstated following the procedure/recovery period, the physician will determine when the applicable recovery period ends for purposes of reinstating the Do Not Resuscitate (DNR) order.    Signature of Patient:_______________________________________________    Signature of person authorized to consent for patient:  _______________________________________________________________    Relationship to patient:  ____________________________________________    Witness: _________________________________________ Date:___________     Physician Signature: _______________________________ Date:___________